# Patient Record
Sex: MALE | Race: OTHER | HISPANIC OR LATINO | URBAN - METROPOLITAN AREA
[De-identification: names, ages, dates, MRNs, and addresses within clinical notes are randomized per-mention and may not be internally consistent; named-entity substitution may affect disease eponyms.]

---

## 2021-01-29 ENCOUNTER — INPATIENT (INPATIENT)
Facility: HOSPITAL | Age: 61
LOS: 4 days | Discharge: NURSING, REHAB OR LONG-TERM CARE | End: 2021-02-03
Attending: INTERNAL MEDICINE | Admitting: INTERNAL MEDICINE
Payer: COMMERCIAL

## 2021-01-29 VITALS
HEIGHT: 71 IN | HEART RATE: 67 BPM | WEIGHT: 179.9 LBS | OXYGEN SATURATION: 97 % | RESPIRATION RATE: 18 BRPM | DIASTOLIC BLOOD PRESSURE: 76 MMHG | TEMPERATURE: 98 F | SYSTOLIC BLOOD PRESSURE: 137 MMHG

## 2021-01-29 LAB
ALBUMIN SERPL ELPH-MCNC: 2.9 G/DL — LOW (ref 3.3–5)
ALP SERPL-CCNC: 92 U/L — SIGNIFICANT CHANGE UP (ref 40–120)
ALT FLD-CCNC: 22 U/L — SIGNIFICANT CHANGE UP (ref 12–78)
ANION GAP SERPL CALC-SCNC: 5 MMOL/L — SIGNIFICANT CHANGE UP (ref 5–17)
APTT BLD: 29.1 SEC — SIGNIFICANT CHANGE UP (ref 27.5–35.5)
AST SERPL-CCNC: 16 U/L — SIGNIFICANT CHANGE UP (ref 15–37)
BASOPHILS # BLD AUTO: 0.08 K/UL — SIGNIFICANT CHANGE UP (ref 0–0.2)
BASOPHILS NFR BLD AUTO: 0.9 % — SIGNIFICANT CHANGE UP (ref 0–2)
BILIRUB SERPL-MCNC: 0.3 MG/DL — SIGNIFICANT CHANGE UP (ref 0.2–1.2)
BUN SERPL-MCNC: 8 MG/DL — SIGNIFICANT CHANGE UP (ref 7–23)
CALCIUM SERPL-MCNC: 8.5 MG/DL — SIGNIFICANT CHANGE UP (ref 8.5–10.1)
CHLORIDE SERPL-SCNC: 111 MMOL/L — HIGH (ref 96–108)
CO2 SERPL-SCNC: 24 MMOL/L — SIGNIFICANT CHANGE UP (ref 22–31)
CREAT SERPL-MCNC: 0.71 MG/DL — SIGNIFICANT CHANGE UP (ref 0.5–1.3)
EOSINOPHIL # BLD AUTO: 0.4 K/UL — SIGNIFICANT CHANGE UP (ref 0–0.5)
EOSINOPHIL NFR BLD AUTO: 4.5 % — SIGNIFICANT CHANGE UP (ref 0–6)
FLUAV AG NPH QL: SIGNIFICANT CHANGE UP COUNTS
FLUBV AG NPH QL: SIGNIFICANT CHANGE UP COUNTS
GLUCOSE BLDC GLUCOMTR-MCNC: 120 MG/DL — HIGH (ref 70–99)
GLUCOSE SERPL-MCNC: 100 MG/DL — HIGH (ref 70–99)
HCT VFR BLD CALC: 41 % — SIGNIFICANT CHANGE UP (ref 39–50)
HGB BLD-MCNC: 13.8 G/DL — SIGNIFICANT CHANGE UP (ref 13–17)
IMM GRANULOCYTES NFR BLD AUTO: 0.3 % — SIGNIFICANT CHANGE UP (ref 0–1.5)
INR BLD: 1.13 RATIO — SIGNIFICANT CHANGE UP (ref 0.88–1.16)
LYMPHOCYTES # BLD AUTO: 1.98 K/UL — SIGNIFICANT CHANGE UP (ref 1–3.3)
LYMPHOCYTES # BLD AUTO: 22.3 % — SIGNIFICANT CHANGE UP (ref 13–44)
MCHC RBC-ENTMCNC: 32 PG — SIGNIFICANT CHANGE UP (ref 27–34)
MCHC RBC-ENTMCNC: 33.7 GM/DL — SIGNIFICANT CHANGE UP (ref 32–36)
MCV RBC AUTO: 95.1 FL — SIGNIFICANT CHANGE UP (ref 80–100)
MONOCYTES # BLD AUTO: 0.59 K/UL — SIGNIFICANT CHANGE UP (ref 0–0.9)
MONOCYTES NFR BLD AUTO: 6.6 % — SIGNIFICANT CHANGE UP (ref 2–14)
NEUTROPHILS # BLD AUTO: 5.81 K/UL — SIGNIFICANT CHANGE UP (ref 1.8–7.4)
NEUTROPHILS NFR BLD AUTO: 65.4 % — SIGNIFICANT CHANGE UP (ref 43–77)
NRBC # BLD: 0 /100 WBCS — SIGNIFICANT CHANGE UP (ref 0–0)
PLATELET # BLD AUTO: 216 K/UL — SIGNIFICANT CHANGE UP (ref 150–400)
POTASSIUM SERPL-MCNC: 3.7 MMOL/L — SIGNIFICANT CHANGE UP (ref 3.5–5.3)
POTASSIUM SERPL-SCNC: 3.7 MMOL/L — SIGNIFICANT CHANGE UP (ref 3.5–5.3)
PROT SERPL-MCNC: 7.5 GM/DL — SIGNIFICANT CHANGE UP (ref 6–8.3)
PROTHROM AB SERPL-ACNC: 13 SEC — SIGNIFICANT CHANGE UP (ref 10.6–13.6)
RBC # BLD: 4.31 M/UL — SIGNIFICANT CHANGE UP (ref 4.2–5.8)
RBC # FLD: 13.6 % — SIGNIFICANT CHANGE UP (ref 10.3–14.5)
RSV RNA NPH QL NAA+NON-PROBE: SIGNIFICANT CHANGE UP COUNTS
SARS-COV-2 RNA SPEC QL NAA+PROBE: SIGNIFICANT CHANGE UP COUNTS
SODIUM SERPL-SCNC: 140 MMOL/L — SIGNIFICANT CHANGE UP (ref 135–145)
TROPONIN I SERPL-MCNC: <.015 NG/ML — SIGNIFICANT CHANGE UP (ref 0.01–0.04)
WBC # BLD: 8.89 K/UL — SIGNIFICANT CHANGE UP (ref 3.8–10.5)
WBC # FLD AUTO: 8.89 K/UL — SIGNIFICANT CHANGE UP (ref 3.8–10.5)

## 2021-01-29 PROCEDURE — 99285 EMERGENCY DEPT VISIT HI MDM: CPT

## 2021-01-29 PROCEDURE — 70450 CT HEAD/BRAIN W/O DYE: CPT | Mod: 26,MA,59

## 2021-01-29 PROCEDURE — 70487 CT MAXILLOFACIAL W/DYE: CPT | Mod: 26

## 2021-01-29 PROCEDURE — 99223 1ST HOSP IP/OBS HIGH 75: CPT

## 2021-01-29 PROCEDURE — 70498 CT ANGIOGRAPHY NECK: CPT | Mod: 26

## 2021-01-29 PROCEDURE — 70496 CT ANGIOGRAPHY HEAD: CPT | Mod: 26

## 2021-01-29 PROCEDURE — 71045 X-RAY EXAM CHEST 1 VIEW: CPT | Mod: 26

## 2021-01-29 PROCEDURE — 93010 ELECTROCARDIOGRAM REPORT: CPT

## 2021-01-29 RX ORDER — ASPIRIN/CALCIUM CARB/MAGNESIUM 324 MG
81 TABLET ORAL DAILY
Refills: 0 | Status: DISCONTINUED | OUTPATIENT
Start: 2021-01-30 | End: 2021-02-03

## 2021-01-29 RX ORDER — ENOXAPARIN SODIUM 100 MG/ML
40 INJECTION SUBCUTANEOUS DAILY
Refills: 0 | Status: DISCONTINUED | OUTPATIENT
Start: 2021-01-29 | End: 2021-02-03

## 2021-01-29 RX ORDER — ATORVASTATIN CALCIUM 80 MG/1
40 TABLET, FILM COATED ORAL AT BEDTIME
Refills: 0 | Status: DISCONTINUED | OUTPATIENT
Start: 2021-01-29 | End: 2021-02-03

## 2021-01-29 RX ORDER — ASPIRIN/CALCIUM CARB/MAGNESIUM 324 MG
325 TABLET ORAL ONCE
Refills: 0 | Status: COMPLETED | OUTPATIENT
Start: 2021-01-29 | End: 2021-01-29

## 2021-01-29 RX ORDER — SODIUM CHLORIDE 9 MG/ML
1000 INJECTION, SOLUTION INTRAVENOUS
Refills: 0 | Status: DISCONTINUED | OUTPATIENT
Start: 2021-01-29 | End: 2021-02-01

## 2021-01-29 RX ADMIN — ATORVASTATIN CALCIUM 40 MILLIGRAM(S): 80 TABLET, FILM COATED ORAL at 22:04

## 2021-01-29 RX ADMIN — SODIUM CHLORIDE 75 MILLILITER(S): 9 INJECTION, SOLUTION INTRAVENOUS at 22:04

## 2021-01-29 RX ADMIN — ENOXAPARIN SODIUM 40 MILLIGRAM(S): 100 INJECTION SUBCUTANEOUS at 23:48

## 2021-01-29 RX ADMIN — Medication 325 MILLIGRAM(S): at 18:51

## 2021-01-29 NOTE — H&P ADULT - ASSESSMENT
61yo male no pertinent PMH presents with stroke.  Per daughter, pt had symptoms starting yesterday at 10am [place] the [place]. He was seen in ER, had CT and covid test and he signed him out and took a flight to [place].     Pt just got off plane and came here. Pt with right sided weakness, but denies headache, cp, sob, dizziness, NV.  61yo male no pertinent PMH presents with stroke.  Per daughter, pt had symptoms starting yesterday at 10am in the [place]. He was seen in ER, had CT and covid test and he signed him out and took a flight to [place].   Pt just got off plane and came here. Pt with right sided weakness, but denies headache, cp, sob, dizziness, NV.     Plan: CVA with left HP, likely right corona radiata infarct. Will start ASA 81 mg/day, Lipitor 40 mg/day, IVF while NPO.   Lipid panel in AM.     Speech and Swallow to determine diet in AM. PT eval.     Needs TTE, tele admit, MRI brain non-contrast. Neuro requests CTA neck.

## 2021-01-29 NOTE — ED PROVIDER NOTE - CLINICAL SUMMARY MEDICAL DECISION MAKING FREE TEXT BOX
Pt with stroke, outside of window for tpa/endovascular. CT noted, will add on CT maxillo with contrast for dr guerrero to follow.

## 2021-01-29 NOTE — ED ADULT TRIAGE NOTE - CHIEF COMPLAINT QUOTE
pt noted with l sided weakness l facial droop started yesterday at 1000am in [place] dx'd with cva in er in  but [place] wanted [place] come [place] and eval denies pain/headache or dizziness at present a&ox3 at present

## 2021-01-29 NOTE — ED PROVIDER NOTE - OBJECTIVE STATEMENT
59yo male with no pertinent pmh presents with stroke. Per daughter, pt had symptoms starting yesterday at 10am in the [place]. He was seen in ER, had CT and covid and he signed him out and took a flight to [place]. Pt just got off flight and came here. Pt with weakness, but denies headache, cp, sob, dizziness, NV.     No fever/chills, No photophobia/eye pain/changes in vision, No ear pain/sore throat/dysphagia, No chest pain/palpitations, no SOB/cough, no wheeze/stridor, No abdominal pain, No N/V/D, no dysuria/frequency/discharge, No neck/back pain, no rash, + stroke symptoms

## 2021-01-29 NOTE — PATIENT PROFILE ADULT - VISION (WITH CORRECTIVE LENSES IF THE PATIENT USUALLY WEARS THEM):
wears glasses, did not bring to hospital/Partially impaired: cannot see medication labels or newsprint, but can see obstacles in path, and the surrounding layout; can count fingers at arm's length

## 2021-01-29 NOTE — H&P ADULT - HISTORY OF PRESENT ILLNESS
61yo male no pertinent PMH presents with stroke.  Per daughter, pt had symptoms starting yesterday at 10am in the [place]. He was seen in ER, had CT and covid test and he signed him out and took a flight to [place].     Pt just got off plane and came here. Pt with right sided weakness, but denies headache, cp, sob, dizziness, NV.  61yo male no pertinent PMH presents with stroke.  Per daughter, pt had symptoms starting yesterday at 10am [place] the [place]. He was seen in ER, had CT and covid test and he signed him out and took a flight to [place].     Pt just got off plane and came here. Pt with left arm weakness and dysarthria, but denies headache, cp, sob, dizziness, NV.

## 2021-01-29 NOTE — H&P ADULT - NSHPPHYSICALEXAM_GEN_ALL_CORE
PHYSICAL EXAMINATION:  Vital Signs Last 24 Hrs  T(C): 36.8 (29 Jan 2021 15:34), Max: 36.8 (29 Jan 2021 15:34)  T(F): 98.2 (29 Jan 2021 15:34), Max: 98.2 (29 Jan 2021 15:34)  HR: 58 (29 Jan 2021 16:12) (58 - 67)  BP: 135/71 (29 Jan 2021 16:12) (135/71 - 146/77)  BP(mean): --  RR: 19 (29 Jan 2021 16:12) (18 - 22)  SpO2: 95% (29 Jan 2021 16:12) (95% - 98%)  CAPILLARY BLOOD GLUCOSE      POCT Blood Glucose.: 120 mg/dL (29 Jan 2021 15:41)      GENERAL: NAD, well-groomed, well-developed  HEAD:  atraumatic, normocephalic  EYES: sclera anicteric  ENMT: mucous membranes moist  NECK: supple, No JVD  CHEST/LUNG: clear to auscultation bilaterally; no rales, rhonchi, or wheezing b/l  HEART: normal S1, S2  ABDOMEN: BS+, soft, ND, NT   EXTREMITIES:  pulses palpable; no clubbing, cyanosis, or edema b/l LEs  NEURO: awake, alert, interactive; moves all extremities  SKIN: no rashes or lesions PHYSICAL EXAMINATION:  Vital Signs Last 24 Hrs  T(C): 36.8 (29 Jan 2021 15:34), Max: 36.8 (29 Jan 2021 15:34)  T(F): 98.2 (29 Jan 2021 15:34), Max: 98.2 (29 Jan 2021 15:34)  HR: 58 (29 Jan 2021 16:12) (58 - 67)  BP: 135/71 (29 Jan 2021 16:12) (135/71 - 146/77)  BP(mean): --  RR: 19 (29 Jan 2021 16:12) (18 - 22)  SpO2: 95% (29 Jan 2021 16:12) (95% - 98%)  CAPILLARY BLOOD GLUCOSE      POCT Blood Glucose.: 120 mg/dL (29 Jan 2021 15:41)      GENERAL: NAD, well-groomed, well-developed, seen in ER  HEAD:  atraumatic, normocephalic  EYES: sclera anicteric  ENMT: mucous membranes moist  NECK: supple, No JVD  CHEST/LUNG: clear to auscultation bilaterally; no rales, rhonchi, or wheezing b/l  HEART: normal S1, S2  ABDOMEN: BS+, soft, ND, NT   EXTREMITIES:  pulses palpable; no clubbing, cyanosis, or edema b/l LEs  NEURO: awake, alert, interactive; moves all extremities  left arm 1/5 MP, left leg 5/5 MP. Right side 5/5 MP. Left central facial.   SKIN: no rashes or lesions

## 2021-01-29 NOTE — ED ADULT NURSE NOTE - CHIEF COMPLAINT QUOTE
pt noted with l sided weakness l facial droop started yesterday at 1000am in [place] dx[place] with cva in [place]  but [place] and eval denies pain/headache or dizziness at present a&ox3 at present

## 2021-01-29 NOTE — ED ADULT NURSE REASSESSMENT NOTE - NS ED NURSE REASSESS COMMENT FT1
Assumed care. Pt laying comfortably in bed. A&Ox4. NSR on the cardiac monitor. VSS. No change in pt condition noted. L sided weakness noted in L arm plus L facial droop. Safety and comfort measures maintained will monitor.

## 2021-01-29 NOTE — ED PROVIDER NOTE - PHYSICAL EXAMINATION
Gen: Alert, Well appearing. NAD    Head: NC, AT, PERRL, normal lids/conjunctiva   ENT: Bilateral TM WNL, patent oropharynx without erythema/exudate, uvula midline  Neck: supple, no tenderness/meningismus  Pulm: Bilateral clear BS, normal resp effort  CV: RRR, no M/R/G, +dist pulses   Abd: soft, NT/ND, +BS, no guarding/rebound tenderness  Mskel: no edema/erythema/cyanosis   Skin: no rash, no bruising  Neuro: AAOx3 see stroke note

## 2021-01-29 NOTE — H&P ADULT - NSHPLABSRESULTS_GEN_ALL_CORE
LABS:                        13.8   8.89  )-----------( 216      ( 29 Jan 2021 16:20 )             41.0     01-29    140  |  111<H>  |  8   ----------------------------<  100<H>  3.7   |  24  |  0.71    Ca    8.5      29 Jan 2021 16:20    TPro  7.5  /  Alb  2.9<L>  /  TBili  0.3  /  DBili  x   /  AST  16  /  ALT  22  /  AlkPhos  92  01-29    PT/INR - ( 29 Jan 2021 16:20 )   PT: 13.0 sec;   INR: 1.13 ratio         PTT - ( 29 Jan 2021 16:20 )  PTT:29.1 sec        RADIOLOGY & ADDITIONAL TESTS:

## 2021-01-29 NOTE — ED PROVIDER NOTE - NIH STROKE SCALE: 6A. MOTOR LEG, LEFT, QM
(1) Drift; leg falls by the end of the 5-sec period but does not hit bed (0) No drift; leg holds 30 degree position for full 5 secs

## 2021-01-29 NOTE — ED ADULT NURSE NOTE - OBJECTIVE STATEMENT
Pt is a 59 yo M with c/o left sided weakness and left sided facial droop that began yesterday approximately 1000am in [place]. Was seen in ED in [facility] and diagnosed with CVA but [place] and eval denies pain/headache or dizziness at present a&ox3 at present.

## 2021-01-29 NOTE — ED ADULT NURSE NOTE - NSIMPLEMENTINTERV_GEN_ALL_ED
Implemented All Fall Risk Interventions:  [place] to call system. Call bell, personal items and telephone within reach. Instruct patient to call for assistance. Room bathroom lighting operational. Non-slip footwear when patient is off stretcher. Physically safe environment: no spills, clutter or unnecessary equipment. Stretcher in lowest position, wheels locked, appropriate side rails in place. Provide visual cue, wrist band, yellow gown, etc. Monitor gait and stability. Monitor for mental status changes and reorient to person, place, and time. Review medications for side effects contributing to fall risk. Reinforce activity limits and safety measures with patient and family.

## 2021-01-30 LAB
A1C WITH ESTIMATED AVERAGE GLUCOSE RESULT: 5.2 % — SIGNIFICANT CHANGE UP (ref 4–5.6)
ANION GAP SERPL CALC-SCNC: 5 MMOL/L — SIGNIFICANT CHANGE UP (ref 5–17)
BUN SERPL-MCNC: 9 MG/DL — SIGNIFICANT CHANGE UP (ref 7–23)
CALCIUM SERPL-MCNC: 8.5 MG/DL — SIGNIFICANT CHANGE UP (ref 8.5–10.1)
CHLORIDE SERPL-SCNC: 114 MMOL/L — HIGH (ref 96–108)
CHOLEST SERPL-MCNC: 126 MG/DL — SIGNIFICANT CHANGE UP
CHOLEST SERPL-MCNC: 128 MG/DL — SIGNIFICANT CHANGE UP
CO2 SERPL-SCNC: 25 MMOL/L — SIGNIFICANT CHANGE UP (ref 22–31)
CREAT SERPL-MCNC: 0.59 MG/DL — SIGNIFICANT CHANGE UP (ref 0.5–1.3)
ESTIMATED AVERAGE GLUCOSE: 103 MG/DL — SIGNIFICANT CHANGE UP (ref 68–114)
GLUCOSE SERPL-MCNC: 86 MG/DL — SIGNIFICANT CHANGE UP (ref 70–99)
HCV AB S/CO SERPL IA: 0.1 S/CO — SIGNIFICANT CHANGE UP (ref 0–0.99)
HCV AB SERPL-IMP: SIGNIFICANT CHANGE UP
HDLC SERPL-MCNC: 21 MG/DL — LOW
HDLC SERPL-MCNC: 21 MG/DL — LOW
LIPID PNL WITH DIRECT LDL SERPL: 83 MG/DL — SIGNIFICANT CHANGE UP
LIPID PNL WITH DIRECT LDL SERPL: 86 MG/DL — SIGNIFICANT CHANGE UP
NON HDL CHOLESTEROL: 105 MG/DL — SIGNIFICANT CHANGE UP
NON HDL CHOLESTEROL: 107 MG/DL — SIGNIFICANT CHANGE UP
POTASSIUM SERPL-MCNC: 3.8 MMOL/L — SIGNIFICANT CHANGE UP (ref 3.5–5.3)
POTASSIUM SERPL-SCNC: 3.8 MMOL/L — SIGNIFICANT CHANGE UP (ref 3.5–5.3)
SODIUM SERPL-SCNC: 144 MMOL/L — SIGNIFICANT CHANGE UP (ref 135–145)
TRIGL SERPL-MCNC: 106 MG/DL — SIGNIFICANT CHANGE UP
TRIGL SERPL-MCNC: 106 MG/DL — SIGNIFICANT CHANGE UP

## 2021-01-30 PROCEDURE — 99232 SBSQ HOSP IP/OBS MODERATE 35: CPT

## 2021-01-30 PROCEDURE — 93306 TTE W/DOPPLER COMPLETE: CPT | Mod: 26

## 2021-01-30 PROCEDURE — 70551 MRI BRAIN STEM W/O DYE: CPT | Mod: 26

## 2021-01-30 RX ADMIN — ENOXAPARIN SODIUM 40 MILLIGRAM(S): 100 INJECTION SUBCUTANEOUS at 13:33

## 2021-01-30 RX ADMIN — ATORVASTATIN CALCIUM 40 MILLIGRAM(S): 80 TABLET, FILM COATED ORAL at 20:53

## 2021-01-30 RX ADMIN — SODIUM CHLORIDE 75 MILLILITER(S): 9 INJECTION, SOLUTION INTRAVENOUS at 13:44

## 2021-01-30 RX ADMIN — Medication 81 MILLIGRAM(S): at 13:33

## 2021-01-30 NOTE — OCCUPATIONAL THERAPY INITIAL EVALUATION ADULT - GENERAL OBSERVATIONS, REHAB EVAL
t was encountered supine in bed; NAD, AXOX4; telemetry, pt able to communicate in english, PT Angely present provided [place] citizen translation PRN. Pt noted with left hemiparesis, dysarthria & decreased dynamic standing balance.

## 2021-01-30 NOTE — PROGRESS NOTE ADULT - ASSESSMENT
59 y/o M with no PMH went to ER in [facility]. He was seen in ER, had CT and covid test and he signed out and took a flight to [place].  Pt just got off plane and came here. Pt with c/o right sided weakness. Found to have CVA.    #Acute / subacute CVA  ASA/ statin   SLP eval  passed bed side swallow eval , will start mechanical soft diet w/ aspiration precautions, d/w RN to watch closely  PT/OT  MRI brain reviewed  follow Echo  telemetry monitoring  LDL 86  hbA1C  pending  Neuro consult- Dr. Johnston    #Parotid lesion  check MRI neck    SQ lovenox for dvt ppx      d/w pt and daughter in detail.

## 2021-01-30 NOTE — OCCUPATIONAL THERAPY INITIAL EVALUATION ADULT - RANGE OF MOTION EXAMINATION
Patient called and PSR explained that it is too early to refill by 8 days, and that she should have enough until 12-13-19. Patient states she takes 2 per day, and her bottle says she filled it last on 11-1-19. Patient that she wanted to order this medication early because of the holiday.     Please call patient to clarify at [phone]   trunk was WFL (within functional limits)

## 2021-01-30 NOTE — OCCUPATIONAL THERAPY INITIAL EVALUATION ADULT - RANGE OF MOTION EXAMINATION, UPPER EXTREMITY
left UE shoulder 0-60 AROM grossly. Elbow/wrist/hand grossly WFL./Right UE Active ROM was WFL (within functional limits)

## 2021-01-30 NOTE — OCCUPATIONAL THERAPY INITIAL EVALUATION ADULT - PERTINENT HX OF CURRENT PROBLEM, REHAB EVAL
Pt admitted with c/o slurred speech and left sided weakness. MRI Head "Acute and/or recent subacute right MCA territory infarct. Underlying chronic ischemic changes also noted with mild volume loss and involutional change.."

## 2021-01-30 NOTE — PHYSICAL THERAPY INITIAL EVALUATION ADULT - BALANCE TRAINING, PT EVAL
pt will increase sitting and standing static and dynamic balance by full grade for safety with ambulation, ADLs and transfers x4 weeks

## 2021-01-30 NOTE — PHYSICAL THERAPY INITIAL EVALUATION ADULT - ACTIVE RANGE OF MOTION EXAMINATION, REHAB EVAL
L UE limited shoulder and elbow AROM to 60 degrees, able to grasp/Right UE Active ROM was WFL (within functional limits)/Left LE Active ROM was WFL (within functional limits)/Right LE Active ROM was WFL (within functional limits)

## 2021-01-30 NOTE — PHYSICAL THERAPY INITIAL EVALUATION ADULT - FINE MOTOR COORDINATION, LEFT HAND, DIADOCHOKINESIS SKILLS, OT EVAL
Anesthesia Evaluation     Patient summary reviewed   no history of anesthetic complications:  NPO Solid Status: Waived due to emergency  NPO Liquid Status: Waived due to emergency           Airway   Mallampati: I  TM distance: >3 FB  Neck ROM: full  No difficulty expected  Dental - normal exam     Pulmonary - negative pulmonary ROS and normal exam   Cardiovascular - normal exam  Exercise tolerance: excellent (>7 METS)    (+) hypertension, hyperlipidemia,       Neuro/Psych- negative ROS  GI/Hepatic/Renal/Endo - negative ROS     Musculoskeletal (-) negative ROS    Abdominal    Substance History      OB/GYN          Other - negative ROS                     Anesthesia Plan    ASA 2 - emergent     general   Rapid sequence  intravenous induction   Anesthetic plan and risks discussed with patient.      
mild impairment

## 2021-01-30 NOTE — CONSULT NOTE ADULT - ASSESSMENT
Subjective Complaints:      Consult requested by ER doctor:                  Attending:     History of Present Illness:  Chief Complaint/Reason for Admission:  History of Present Illness:  HPI:  61yo male no pertinent PMH presents with stroke.  Per daughter, pt had symptoms starting yesterday at 10am in the [place]. He was seen in ER, had CT and covid test and he signed him out and took a flight to [place].     Pt just got off plane and came here. Pt with left arm weakness and dysarthria, but denies headache, cp, sob, dizziness, NV.  (29 Jan 2021 18:26)        PAST MEDICAL & SURGICAL HISTORY:  No pertinent past medical history    No significant past surgical history    60yMale    MEDICATIONS  (STANDING):  aspirin enteric coated 81 milliGRAM(s) Oral daily  atorvastatin 40 milliGRAM(s) Oral at bedtime  enoxaparin Injectable 40 milliGRAM(s) SubCutaneous daily  sodium chloride 0.45%. 1000 milliLiter(s) (75 mL/Hr) IV Continuous <Continuous>    MEDICATIONS  (PRN):      Allergies    No Known Allergies    Intolerances      FAMILY HISTORY:      REVIEW OF SYSTEMS:  General:  No wt loss, fevers, chills, night sweats  Eyes:  Good vision, no reported pain  ENT:  No sore throat, pain, runny nose, dysphagia  CV:  No pain, palpitatioins, hypo/hypertension  Resp:  No dyspnea, cough, tachypnea, wheezing  GI:  No pain, nausea, vomiting, diarrhea, constipatiion  :  No pain, bleeding, incontinence, nocturia  Muscle:  No pain, weakness  Breast:  No pain, abscess, mass, discharge  Neuro:  No weakness, tingling, memory problems  Psych:  No fatigue, insomnia, mood problems, depression  Endocrine:  No polyuria, polydypsia, cold/heat intolerance  Heme:  No petechiae, ecchymosis, easy bruisability  Skin:  No rash, tattoos, scars, edema      Vital Signs Last 24 Hrs  T(C): 37.1 (30 Jan 2021 16:06), Max: 37.1 (30 Jan 2021 16:06)  T(F): 98.8 (30 Jan 2021 16:06), Max: 98.8 (30 Jan 2021 16:06)  HR: 53 (30 Jan 2021 16:06) (46 - 68)  BP: 128/79 (30 Jan 2021 16:06) (123/71 - 155/89)  BP(mean): --  RR: 18 (30 Jan 2021 16:06) (16 - 19)  SpO2: 93% (30 Jan 2021 16:06) (93% - 97%)    GENERAL PHYSICAL EXAM:  General:  Appears stated age, well-groomed, well-nourished, no distress  HEENT:  NC/AT, patent nares w/ pink mucosa, OP clear w/o lesions, PERRL, EOMI, conjunctivae clear, no thyromegaly, nodules, adenopathy, no JVD  Chest:  Full & symmetric excursion, no increased effort, breath sounds clear  Cardiovascular:  Regular rhythm, S1, S2, no murmur/rub/S3/S4, no carotid/femoral/abdominal bruit, radial/pedal pulses 2+, no edema  Abdomen:  Soft, non-tender, non-distended, normoactive bowel sounds, no HSM  Extremities:  Gait & station:   Digits:   Nails:   Joints, Bones, Muscles:   ROM:   Stability:  Skin:  No rash/erythema/ecchymoses/petechiae/wounds/abscess/warm/dry  Musculoskeletal:  Full ROM in all joints w/o swelling/tenderness/effusion    NEUROLOGICAL EXAM:  HENT:  Normocephalic head; atraumatic head.  Neck supple.  ENT: normal looking.  Mental State:    Alert.  Fully oriented to person, place and date.  Coherent.  Speech clear and intact.  Cooperative.  Responds appropriately.    Cranial Nerves:  II-XII:   Pupils round and reactive to light and accommodation.  Extraocular movements full.  Visual fields full (no homonymous hemianopsia).  Visual acuity wnl.  Facial left side wekaness  s intact.  Tongue midline.  Motor Functions:  Moves all extremities.    r cva left hemeparesis s   Sensory Functions:   Intact to touch and pinprick to face and extremities.    Reflexes:  Deep tendon reflexes normoactive to biceps, knees and ankles.  Babinski t (present).  Cerebellar Testing:    Finger to nose intact.  Nystagmus absent.  Neurovascular: Carotid auscultation full without bruits.      LABS:                        13.8   8.89  )-----------( 216      ( 29 Jan 2021 16:20 )             41.0     01-30    144  |  114<H>  |  9   ----------------------------<  86  3.8   |  25  |  0.59    Ca    8.5      30 Jan 2021 09:55    TPro  7.5  /  Alb  2.9<L>  /  TBili  0.3  /  DBili  x   /  AST  16  /  ALT  22  /  AlkPhos  92  01-29    PT/INR - ( 29 Jan 2021 16:20 )   PT: 13.0 sec;   INR: 1.13 ratio         PTT - ( 29 Jan 2021 16:20 )  PTT:29.1 sec        RADIOLOGY & ADDITIONAL STUDIES:      Assessment & Opinion:  events noted r cva left hemepresis htn  for echo dollper asa 81 mg  for pt rehab   tsh b12  will folow  mri mati  noted r cva     Recommendations:  Brain MRI.  Carotid doppler.  Echocardiogram.  EEG.   DVT prophylaxis as ordered.  Medications:

## 2021-01-30 NOTE — PHYSICAL THERAPY INITIAL EVALUATION ADULT - FOLLOWS COMMANDS/ANSWERS QUESTIONS, REHAB EVAL
verbal cueing and manual guidance with carryover, dysarthria/100% of the time/able to follow multistep instructions/able to follow single-step instructions

## 2021-01-30 NOTE — OCCUPATIONAL THERAPY INITIAL EVALUATION ADULT - ADDITIONAL COMMENTS
Pt reports he lives with spouse and children in private house with 5 steps to enter with bilateral hand rails & 1 flight of stairs to second floor bedroom. Pt states he was independent with ADLs and mobility without assistive device prior to admission.

## 2021-01-30 NOTE — PHYSICAL THERAPY INITIAL EVALUATION ADULT - PERTINENT HX OF CURRENT PROBLEM, REHAB EVAL
59yo male no pertinent PMH presents with stroke.  Per daughter, pt had symptoms starting yesterday at 10am in the [place]. He was seen in ER, had CT and covid test and he signed him out and took a flight to [place]. MRI (+) acute/sub acute R MCA territory infarct.

## 2021-01-30 NOTE — PHYSICAL THERAPY INITIAL EVALUATION ADULT - GENERAL OBSERVATIONS, REHAB EVAL
Pt encountered supine in bed, alert and oriented x4, cardiac monitor and iv in place, NAD. Pt has returned from MRI, (+) CVA.

## 2021-01-31 LAB
SARS-COV-2 IGG SERPL QL IA: NEGATIVE — SIGNIFICANT CHANGE UP
SARS-COV-2 IGM SERPL IA-ACNC: <0.1 INDEX — SIGNIFICANT CHANGE UP

## 2021-01-31 PROCEDURE — 99232 SBSQ HOSP IP/OBS MODERATE 35: CPT

## 2021-01-31 PROCEDURE — 70543 MRI ORBT/FAC/NCK W/O &W/DYE: CPT | Mod: 26

## 2021-01-31 RX ADMIN — ATORVASTATIN CALCIUM 40 MILLIGRAM(S): 80 TABLET, FILM COATED ORAL at 20:38

## 2021-01-31 RX ADMIN — SODIUM CHLORIDE 75 MILLILITER(S): 9 INJECTION, SOLUTION INTRAVENOUS at 10:14

## 2021-01-31 RX ADMIN — ENOXAPARIN SODIUM 40 MILLIGRAM(S): 100 INJECTION SUBCUTANEOUS at 10:14

## 2021-01-31 RX ADMIN — Medication 81 MILLIGRAM(S): at 10:14

## 2021-01-31 NOTE — PROGRESS NOTE ADULT - ASSESSMENT
Subjective Complaints:  Historian:             Vital Signs Last 24 Hrs  T(C): 36.9 (31 Jan 2021 16:20), Max: 36.9 (31 Jan 2021 01:41)  T(F): 98.4 (31 Jan 2021 16:20), Max: 98.4 (31 Jan 2021 01:41)  HR: 63 (31 Jan 2021 16:20) (49 - 68)  BP: 136/80 (31 Jan 2021 16:20) (128/72 - 152/83)  BP(mean): --  RR: 18 (31 Jan 2021 16:20) (18 - 20)  SpO2: 93% (31 Jan 2021 16:20) (93% - 97%)    GENERAL PHYSICAL EXAM:  General:  Appears stated age, well-groomed, well-nourished, no distress  HEENT:  NC/AT, patent nares w/ pink mucosa, OP clear w/o lesions, PERRL, EOMI, conjunctivae clear, no thyromegaly, nodules, adenopathy, no JVD  Chest:  Full & symmetric excursion, no increased effort, breath sounds clear  Cardiovascular:  Regular rhythm, S1, S2, no murmur/rub/S3/S4, no carotid/femoral/abdominal bruit, radial/pedal pulses 2+, no edema  Abdomen:  Soft, non-tender, non-distended, normoactive bowel sounds, no HSM  Extremities:  Gait & station:   Digits:   Nails:   Joints, Bones, Muscles:   ROM:   Stability:  Skin:  No rash/erythema/ecchymoses/petechiae/wounds/abscess/warm/dry  Musculoskeletal:  Full ROM in all joints w/o swelling/tenderness/effusion        LABS:    01-30    144  |  114<H>  |  9   ----------------------------<  86  3.8   |  25  |  0.59    Ca    8.5      30 Jan 2021 09:55            RADIOLOGY & ADDITIONAL STUDIES:        Neurology Progress Note:      Mental Status: awaek alert speech fluent       Cranial Nerves: left face wekaness       Motor:   r cva left hemeparesis         Sensory: intact      Cerebellar: defrd      Gait:  unsteady       Assesment/Plan:  r cva left hemeoaresis htn on asa for pt rehab at [facility]

## 2021-01-31 NOTE — PROGRESS NOTE ADULT - ASSESSMENT
59 y/o M with no PMH went to ER in [facility]. He was seen in ER, had CT and covid test and he signed out and took a flight to [place].  Pt just got off plane and came here. Pt with c/o right sided weakness. Found to have CVA.    #Acute / subacute CVA  ASA/ statin   SLP eval  passed bed side swallow eval , on mechanical soft diet w/ aspiration precautions, await official swallow eval  PT/OT eval noted > recommend dc dispo to acute rehab   MRI brain and echo reviewed.  cont telemetry monitoring  hbA1C  5.2  Neuro following    #Parotid lesion  MRI neck done, results pending    SQ lovenox for dvt ppx      d/w pt and daughter Laura in detail. # [phone]     61 y/o M with no PMH went to ER in [facility]. He was seen in ER, had CT and covid test and he signed out and took a flight to [place].  Pt just got off plane and came here. Pt with c/o right sided weakness. Found to have CVA.    #Acute / subacute CVA  ASA/ statin   SLP eval  passed bed side swallow eval , on mechanical soft diet w/ aspiration precautions, await official swallow eval  PT/OT eval noted > recommend dc dispo to acute rehab   MRI brain and echo reviewed.  cont telemetry monitoring  hbA1C  5.2  Neuro following    #Parotid lesion  MRI neck done, results pending    SQ lovenox for dvt ppx      Likely d/c planning in am.  Daughter little hesitant to send him to rehab but would like to speak to Cm and SW tomorrow to discuss options.     d/w pt and daughter Laura in detail. # [phone]

## 2021-02-01 PROCEDURE — 99232 SBSQ HOSP IP/OBS MODERATE 35: CPT

## 2021-02-01 RX ADMIN — Medication 81 MILLIGRAM(S): at 12:01

## 2021-02-01 RX ADMIN — ENOXAPARIN SODIUM 40 MILLIGRAM(S): 100 INJECTION SUBCUTANEOUS at 12:01

## 2021-02-01 RX ADMIN — SODIUM CHLORIDE 75 MILLILITER(S): 9 INJECTION, SOLUTION INTRAVENOUS at 03:06

## 2021-02-01 RX ADMIN — ATORVASTATIN CALCIUM 40 MILLIGRAM(S): 80 TABLET, FILM COATED ORAL at 20:49

## 2021-02-01 NOTE — PROGRESS NOTE ADULT - ASSESSMENT
Subjective Complaints:  Historian:             Vital Signs Last 24 Hrs  T(C): 36.4 (01 Feb 2021 16:45), Max: 37.2 (01 Feb 2021 00:44)  T(F): 97.5 (01 Feb 2021 16:45), Max: 99 (01 Feb 2021 00:44)  HR: 60 (01 Feb 2021 16:45) (45 - 60)  BP: 128/74 (01 Feb 2021 16:45) (128/74 - 159/67)  BP(mean): --  RR: 17 (01 Feb 2021 16:45) (17 - 18)  SpO2: 94% (01 Feb 2021 16:45) (92% - 96%)    GENERAL PHYSICAL EXAM:  General:  Appears stated age, well-groomed, well-nourished, no distress  HEENT:  NC/AT, patent nares w/ pink mucosa, OP clear w/o lesions, PERRL, EOMI, conjunctivae clear, no thyromegaly, nodules, adenopathy, no JVD  Chest:  Full & symmetric excursion, no increased effort, breath sounds clear  Cardiovascular:  Regular rhythm, S1, S2, no murmur/rub/S3/S4, no carotid/femoral/abdominal bruit, radial/pedal pulses 2+, no edema  Abdomen:  Soft, non-tender, non-distended, normoactive bowel sounds, no HSM  Extremities:  Gait & station:   Digits:   Nails:   Joints, Bones, Muscles:   ROM:   Stability:  Skin:  No rash/erythema/ecchymoses/petechiae/wounds/abscess/warm/dry  Musculoskeletal:  Full ROM in all joints w/o swelling/tenderness/effusion        LABS:                RADIOLOGY & ADDITIONAL STUDIES:        Neurology Progress Note:      Mental Status: awaek alert speech fluent       Cranial Nerves: left face wekaness       Motor:   r cva left henmeparesis         Sensory: intact      Cerebellar: defrd      Gait:unsteadsyt       Assesment/Plan:   r  cva  left hemeparesi for acute rehab at [facility]

## 2021-02-01 NOTE — PROGRESS NOTE ADULT - ASSESSMENT
59 y/o M with no PMH went to ER in [facility]. He was seen in ER, had CT and covid test and he signed out and took a flight to [place].  Pt just got off plane and came here. Pt with c/o right sided weakness. Found to have CVA.    #Acute / subacute CVA  ASA/ statin   SLP eval  passed bed side swallow eval , on mechanical soft diet w/ aspiration precautions, await official swallow eval  PT/OT eval noted > recommend dc dispo to acute rehab   MRI brain and echo reviewed.  cont telemetry monitoring  hbA1C  5.2  Neuro following    #Parotid lesion  MRI neck: b/l nodules   will monitor     SQ lovenox for dvt ppx      Likely d/c planning in am.  Daughter little hesitant to send him to rehab but would like to speak to Cm and SW tomorrow to discuss options.     d/w pt and daughter Laura in detail. # [phone]

## 2021-02-02 LAB
FLUAV AG NPH QL: SIGNIFICANT CHANGE UP COUNTS
FLUBV AG NPH QL: SIGNIFICANT CHANGE UP COUNTS
RSV RNA NPH QL NAA+NON-PROBE: SIGNIFICANT CHANGE UP COUNTS
SARS-COV-2 RNA SPEC QL NAA+PROBE: SIGNIFICANT CHANGE UP COUNTS

## 2021-02-02 PROCEDURE — 99232 SBSQ HOSP IP/OBS MODERATE 35: CPT

## 2021-02-02 RX ADMIN — Medication 81 MILLIGRAM(S): at 10:56

## 2021-02-02 RX ADMIN — ENOXAPARIN SODIUM 40 MILLIGRAM(S): 100 INJECTION SUBCUTANEOUS at 10:55

## 2021-02-02 RX ADMIN — ATORVASTATIN CALCIUM 40 MILLIGRAM(S): 80 TABLET, FILM COATED ORAL at 21:05

## 2021-02-02 NOTE — PROGRESS NOTE ADULT - ASSESSMENT
Subjective Complaints:  Historian:             Vital Signs Last 24 Hrs  T(C): 37.1 (02 Feb 2021 16:25), Max: 37.1 (02 Feb 2021 16:25)  T(F): 98.8 (02 Feb 2021 16:25), Max: 98.8 (02 Feb 2021 16:25)  HR: 78 (02 Feb 2021 18:50) (50 - 79)  BP: 129/74 (02 Feb 2021 18:50) (120/77 - 134/85)  BP(mean): --  RR: 18 (02 Feb 2021 18:50) (18 - 20)  SpO2: 92% (02 Feb 2021 18:50) (92% - 96%)    GENERAL PHYSICAL EXAM:  General:  Appears stated age, well-groomed, well-nourished, no distress  HEENT:  NC/AT, patent nares w/ pink mucosa, OP clear w/o lesions, PERRL, EOMI, conjunctivae clear, no thyromegaly, nodules, adenopathy, no JVD  Chest:  Full & symmetric excursion, no increased effort, breath sounds clear  Cardiovascular:  Regular rhythm, S1, S2, no murmur/rub/S3/S4, no carotid/femoral/abdominal bruit, radial/pedal pulses 2+, no edema  Abdomen:  Soft, non-tender, non-distended, normoactive bowel sounds, no HSM  Extremities:  Gait & station:   Digits:   Nails:   Joints, Bones, Muscles:   ROM:   Stability:  Skin:  No rash/erythema/ecchymoses/petechiae/wounds/abscess/warm/dry  Musculoskeletal:  Full ROM in all joints w/o swelling/tenderness/effusion        LABS:                RADIOLOGY & ADDITIONAL STUDIES:        Neurology Progress Note:      Mental Status: awaek alert speech fluent   follows commands       Cranial Nerves: left face wekaness       Motor:   r cv aleft hemeparesis         Sensory:intact      Cerebellar: defrd      Gait: unsteady       Assesment/Plan:  r c  cva    left hemepatresis unsteady gait for acute rehab at   [facility]  on asa will follow

## 2021-02-02 NOTE — PROGRESS NOTE ADULT - ASSESSMENT
#Acute / subacute CVA  MRI Head: 1)  Acute and/or recent subacute right MCA territory infarct. Underlying chronic ischemic changes also noted with mild volume loss and involutional change..  2)  no hemorrhagic lesion or mass. No gross large vessel occlusion.  Echo: benign   cont w/ Aspirin and statin   Rehab on discharge     #Parotid lesion  MRI neck: b/l nodules   will monitor     SQ lovenox for dvt ppx

## 2021-02-03 ENCOUNTER — TRANSCRIPTION ENCOUNTER (OUTPATIENT)
Age: 61
End: 2021-02-03

## 2021-02-03 ENCOUNTER — INPATIENT (INPATIENT)
Facility: HOSPITAL | Age: 61
LOS: 9 days | Discharge: HOME HEALTH | DRG: 949 | End: 2021-02-13
Attending: SPECIALIST | Admitting: STUDENT IN AN ORGANIZED HEALTH CARE EDUCATION/TRAINING PROGRAM
Payer: COMMERCIAL

## 2021-02-03 VITALS
TEMPERATURE: 98 F | RESPIRATION RATE: 18 BRPM | HEART RATE: 78 BPM | OXYGEN SATURATION: 94 % | SYSTOLIC BLOOD PRESSURE: 118 MMHG | DIASTOLIC BLOOD PRESSURE: 77 MMHG

## 2021-02-03 VITALS
DIASTOLIC BLOOD PRESSURE: 78 MMHG | TEMPERATURE: 98 F | HEIGHT: 67 IN | OXYGEN SATURATION: 98 % | SYSTOLIC BLOOD PRESSURE: 124 MMHG | WEIGHT: 205.69 LBS | HEART RATE: 58 BPM | RESPIRATION RATE: 14 BRPM

## 2021-02-03 DIAGNOSIS — I63.9 CEREBRAL INFARCTION, UNSPECIFIED: ICD-10-CM

## 2021-02-03 PROBLEM — Z78.9 OTHER SPECIFIED HEALTH STATUS: Chronic | Status: ACTIVE | Noted: 2021-01-29

## 2021-02-03 PROCEDURE — 99223 1ST HOSP IP/OBS HIGH 75: CPT

## 2021-02-03 PROCEDURE — 99239 HOSP IP/OBS DSCHRG MGMT >30: CPT

## 2021-02-03 RX ORDER — ATORVASTATIN CALCIUM 80 MG/1
1 TABLET, FILM COATED ORAL
Qty: 0 | Refills: 0 | DISCHARGE
Start: 2021-02-03

## 2021-02-03 RX ORDER — PETROLATUM,WHITE
1 JELLY (GRAM) TOPICAL DAILY
Refills: 0 | Status: DISCONTINUED | OUTPATIENT
Start: 2021-02-03 | End: 2021-02-13

## 2021-02-03 RX ORDER — POLYETHYLENE GLYCOL 3350 17 G/17G
17 POWDER, FOR SOLUTION ORAL DAILY
Refills: 0 | Status: DISCONTINUED | OUTPATIENT
Start: 2021-02-03 | End: 2021-02-04

## 2021-02-03 RX ORDER — ATORVASTATIN CALCIUM 80 MG/1
40 TABLET, FILM COATED ORAL AT BEDTIME
Refills: 0 | Status: DISCONTINUED | OUTPATIENT
Start: 2021-02-03 | End: 2021-02-13

## 2021-02-03 RX ORDER — ENOXAPARIN SODIUM 100 MG/ML
40 INJECTION SUBCUTANEOUS DAILY
Refills: 0 | Status: DISCONTINUED | OUTPATIENT
Start: 2021-02-03 | End: 2021-02-13

## 2021-02-03 RX ORDER — SENNA PLUS 8.6 MG/1
2 TABLET ORAL AT BEDTIME
Refills: 0 | Status: DISCONTINUED | OUTPATIENT
Start: 2021-02-03 | End: 2021-02-13

## 2021-02-03 RX ORDER — ASPIRIN/CALCIUM CARB/MAGNESIUM 324 MG
1 TABLET ORAL
Qty: 0 | Refills: 0 | DISCHARGE
Start: 2021-02-03

## 2021-02-03 RX ORDER — ASPIRIN/CALCIUM CARB/MAGNESIUM 324 MG
81 TABLET ORAL DAILY
Refills: 0 | Status: DISCONTINUED | OUTPATIENT
Start: 2021-02-03 | End: 2021-02-13

## 2021-02-03 RX ADMIN — POLYETHYLENE GLYCOL 3350 17 GRAM(S): 17 POWDER, FOR SOLUTION ORAL at 17:42

## 2021-02-03 RX ADMIN — Medication 200 MILLIGRAM(S): at 16:50

## 2021-02-03 RX ADMIN — Medication 1 APPLICATION(S): at 17:35

## 2021-02-03 RX ADMIN — Medication 81 MILLIGRAM(S): at 11:06

## 2021-02-03 RX ADMIN — SENNA PLUS 2 TABLET(S): 8.6 TABLET ORAL at 21:07

## 2021-02-03 RX ADMIN — ATORVASTATIN CALCIUM 40 MILLIGRAM(S): 80 TABLET, FILM COATED ORAL at 21:07

## 2021-02-03 RX ADMIN — ENOXAPARIN SODIUM 40 MILLIGRAM(S): 100 INJECTION SUBCUTANEOUS at 11:07

## 2021-02-03 RX ADMIN — Medication 200 MILLIGRAM(S): at 11:07

## 2021-02-03 NOTE — PROGRESS NOTE ADULT - ASSESSMENT
61yo M denies significant PMH presented with L sided weakness, found to have acute / subacute R MCA infarct.  Symptoms improving. Stable for Acute rehab placement.      #Acute / subacute CVA - MRI Head: 1)  Acute and/or recent subacute right MCA territory infarct. Underlying chronic ischemic changes also noted with mild volume loss and involutional change..  2)  no hemorrhagic lesion or mass. No gross large vessel occlusion. Echo: benign.  Symptoms improving.  Cont w/ Aspirin and statin Rehab on discharge     #Parotid lesion - MRI showed: Nonspecific intraparotid nodules most consistent with adenopathy.  Discussed with patient to followup with PMD to monitor for progression.

## 2021-02-03 NOTE — H&P ADULT - ASSESSMENT
Assessment/Plan:  SHAWNA LAURENT is a 60y with no significant PMHx presented to [facility] 1/29/21 for stroke symptoms that began the day before in [place]. He signed out of ED, flew to [place], and went to [facility]. MRI showed R MCA stroke.       Comprehensive Multidisciplinary Rehab Program:  - Start comprehensive rehab program, PT/OT/SLP 3 hours a day, 5 days a week.  - Precautions: falls, aspiration    #R MCA CVA  - Continue ASA and Lipitor  - F/u neuro, Dr. Mota, outpatient    #Intraparotid Nodules  - F/u with PCP outpatient    /Bladder:   - At risk for incontinence and retention due to neurologic diagnosis and limited mobility  - Currently patient voids independently  - Catheter/bladder nursing protocol with bladder scans per routine with straight cath for >400cc.  - Encourage timed voids every 4 hours while awake for independence and to promote continence during therapy.    Skin/Pressure Injury:   - At risk for pressure injury due to neurologic diagnosis and relative immobility.  - Skin assessment on admission: ***  - Turn every 2 hours while in bed, air mattress  - Soft heel protectors  - Skin barrier cream as needed  - Nursing to monitor skin Qshift    Diet/Dysphagia:  - [facility]  - Aspiration Precautions  - SLP consult for swallow function evaluation and treatment    DVT ppx:  - Lovenox  - SCDs  ---------------  Outpatient Follow-up (Specialty/Name of physician):  Alfreod Mota  NEUROLOGY  [address] [place] [address] [id number]  Phone: [phone]  Fax: [phone]  --------------  Goals: Safe discharge to home  Estimated Length of Stay: 10-14 days  Rehab Potential: Good  Medical Prognosis: Good  Estimated Disposition: Home with Home Care  ---------------    PRESCREEN COMPARISON:  I have reviewed the prescreen information and I have found no relevant changes between the preadmission screening and my post admission evaluation.    RATIONALE FOR INPATIENT ADMISSION: Patient demonstrates the following:  [X] Medically appropriate for rehabilitation admission  [X] Has attainable rehab goals with an appropriate initial discharge plan  [X]Has rehabilitation potential (expected to make a significant improvement within a reasonable period of time)  [X] Requires close medical management by a rehab physician, rehab nursing care, Hospitalist and comprehensive interdisciplinary team (including PT, OT and/or SLP, Prosthetics and Orthotics)       Assessment/Plan:  SHAWNA LAURENT is a 60y with no significant PMHx presented to [facility] 1/29/21 for stroke symptoms that began the day before in [place]. He signed out of ED, flew to [place], and went to [facility]. MRI showed R MCA stroke.       #R MCA CVA with Left sided weakness  - Continue ASA and Lipitor  - F/u neuro, Dr. Mota, outpatient  - Start comprehensive rehab program, PT/OT/SLP 3 hours a day, 5 days a week.  - Precautions: falls, aspiration    #Intraparotid Nodules  - F/u with PCP outpatient    /Bladder:   - Currently patient voids independently  - Catheter/bladder nursing protocol with bladder scans per routine with straight cath for >400cc.  - Encourage timed voids every 4 hours while awake for independence and to promote continence during therapy.    Skin/Pressure Injury:   - At risk for pressure injury due to neurologic diagnosis and relative immobility.  - Skin assessment on admission: intact  - Turn every 2 hours while in bed, air mattress  - Soft heel protectors  - Skin barrier cream as needed  - Nursing to monitor skin Qshift    Diet/Dysphagia:  - Diet Consistency/Modifications: [facility] soft  - Aspiration Precautions  - SLP consult for swallow function evaluation and treatment    DVT ppx:  - Lovenox  - SCDs  ---------------  Outpatient Follow-up (Specialty/Name of physician):  Alfredo Mota  NEUROLOGY  [address] [place] [address] [id number]  Phone: [phone]  Fax: [phone]  --------------  Goals: Safe discharge to home  Estimated Length of Stay: 10-14 days  Rehab Potential: Good  Medical Prognosis: Good  Estimated Disposition: Home with Home Care  ---------------    PRESCREEN COMPARISON:  I have reviewed the prescreen information and I have found no relevant changes between the preadmission screening and my post admission evaluation.    RATIONALE FOR INPATIENT ADMISSION: Patient demonstrates the following:  [X] Medically appropriate for rehabilitation admission  [X] Has attainable rehab goals with an appropriate initial discharge plan  [X]Has rehabilitation potential (expected to make a significant improvement within a reasonable period of time)  [X] Requires close medical management by a rehab physician, rehab nursing care, Hospitalist and comprehensive interdisciplinary team (including PT, OT and/or SLP, Prosthetics and Orthotics)       Assessment/Plan:  SHAWNA LAURENT is a 60y with no significant PMHx presented to [facility] 1/29/21 for stroke symptoms that began the day before in [place]. He signed out of ED, flew to [place], and went to [facility]. MRI showed R MCA stroke.       #R MCA CVA with Left sided weakness  - Continue ASA and Lipitor  - F/u neuro, Dr. Mota, outpatient  - Start comprehensive rehab program, PT/OT/SLP 3 hours a day, 5 days a week.  - Precautions: falls, aspiration    #Intraparotid Nodules  - F/u with PCP outpatient    /Bladder:   - monitor voiding- PVRs  - Encourage timed voids every 4 hours while awake for independence and to promote continence during therapy.    Skin/Pressure Injury:   - At risk for pressure injury due to neurologic diagnosis and relative immobility.  - Skin assessment on admission: intact  - Turn every 2 hours while in bed, air mattress  - Soft heel protectors  - Skin barrier cream as needed  - Nursing to monitor skin Qshift    Diet/Dysphagia:  - [facility]  - Aspiration Precautions  - SLP consult for swallow function evaluation and treatment    DVT ppx:  - Lovenox  - SCDs  ---------------  Outpatient Follow-up (Specialty/Name of physician):  Alfredo Mota  NEUROLOGY  [address] [place] [address] [id number]  Phone: [phone]  Fax: [phone]  --------------  Goals: Safe discharge to home  Estimated Length of Stay: 10-14 days  Rehab Potential: Good  Medical Prognosis: Good  Estimated Disposition: Home with Home Care  ---------------    PRESCREEN COMPARISON:  I have reviewed the prescreen information and I have found no relevant changes between the preadmission screening and my post admission evaluation.    RATIONALE FOR INPATIENT ADMISSION: Patient demonstrates the following:  [X] Medically appropriate for rehabilitation admission  [X] Has attainable rehab goals with an appropriate initial discharge plan  [X]Has rehabilitation potential (expected to make a significant improvement within a reasonable period of time)  [X] Requires close medical management by a rehab physician, rehab nursing care, Hospitalist and comprehensive interdisciplinary team (including PT, OT and/or SLP, Prosthetics and Orthotics)

## 2021-02-03 NOTE — H&P ADULT - NSHPPHYSICALEXAM_GEN_ALL_CORE
Vital Signs  T(C): 36.8 (02-03-21 @ 13:24), Max: 37.2 (02-03-21 @ 00:10)  HR: 78 (02-03-21 @ 13:24) (47 - 79)  BP: 118/77 (02-03-21 @ 13:24) (118/77 - 136/77)  RR: 18 (02-03-21 @ 13:24) (18 - 18)  SpO2: 94% (02-03-21 @ 13:24) (91% - 96%)    Gen - NAD, Comfortable  HEENT - NCAT, EOMI, MMM  Neck - Supple, No limited ROM  Pulm - CTAB, No wheeze, No rhonchi, No crackles  Cardiovascular - RRR, S1S2, No m/r/g  Abdomen - Soft, NT/ND, +BS  Extremities - No C/C/E, No calf tenderness  Neuro-     Cognitive - AAOx3     Communication - Fluent, No dysarthria     Attention: Intact      Judgement: Good evidence of judgement     Memory: Recall 3 objects immediate and 3 min later         Cranial Nerves - CN 2-12 intact     Motor -                    LEFT    UE - ShAB 5/5, EF 5/5, EE 5/5, WE 5/5,  5/5                    RIGHT UE - ShAB 5/5, EF 5/5, EE 5/5, WE 5/5,  5/5                    LEFT    LE - HF 5/5, KE 5/5, DF 5/5, PF 5/5                    RIGHT LE - HF 5/5, KE 5/5, DF 5/5, PF 5/5        Sensory - Intact to LT     Reflexes - DTR Intact, No primitive reflex     Coordination - FTN intact     Tone - normal  Psychiatric - Mood stable, Affect WNL  Skin: Intact  Wounds: None Present Vital Signs  T(C): 36.8 (02-03-21 @ 13:24), Max: 37.2 (02-03-21 @ 00:10)  HR: 78 (02-03-21 @ 13:24) (47 - 79)  BP: 118/77 (02-03-21 @ 13:24) (118/77 - 136/77)  RR: 18 (02-03-21 @ 13:24) (18 - 18)  SpO2: 94% (02-03-21 @ 13:24) (91% - 96%)    Gen - NAD, Comfortable  HEENT - NCAT, EOMI, MMM  Neck - Supple, No limited ROM  Pulm - CTAB, No wheeze, No rhonchi, No crackles  Cardiovascular - RRR, S1S2, No m/r/g  Abdomen - Soft, NT/ND, +BS  Extremities - No C/C/E, No calf tenderness  Neuro-     Cognitive - AAOx4 to person, place, time and situation. Able to name days of week in order and backwards. Able to perform serial 7's with minimal errors     Communication - Fluent, +dysarthria     Attention: Intact      Judgement: Good evidence of judgement     Memory: Recall 3 objects immediate and 3 min later         Cranial Nerves - CN 2-12 intact     Motor -                    LEFT    UE - ShAB 3/5, EF 3/5, EE 3/5, WE 2/5,  2/5                    RIGHT UE - ShAB 5/5, EF 5/5, EE 5/5, WE 5/5,  5/5                    LEFT    LE - HF 4/5, KE 5/5, DF 5/5, PF 5/5                    RIGHT LE - HF 5/5, KE 5/5, DF 5/5, PF 5/5        Sensory - Intact to LT     Reflexes - DTR Intact, No primitive reflex, BL positive Hoffmans     Coordination - mild L FTN dysmetria     Tone - normal  Psychiatric - Mood stable, Affect WNL  Skin: Intact  Wounds: None Present Vital Signs  T(C): 36.8 (02-03-21 @ 13:24), Max: 37.2 (02-03-21 @ 00:10)  HR: 78 (02-03-21 @ 13:24) (47 - 79)  BP: 118/77 (02-03-21 @ 13:24) (118/77 - 136/77)  RR: 18 (02-03-21 @ 13:24) (18 - 18)  SpO2: 94% (02-03-21 @ 13:24) (91% - 96%)    Gen - NAD, Comfortable  HEENT - NCAT, EOMI, MMM  Neck - Supple, No limited ROM  Pulm - CTAB, No wheeze, No rhonchi, No crackles  Cardiovascular - RRR, S1S2, No m/r/g  Abdomen - Soft, NT/ND, +BS  Extremities - No C/C/E, No calf tenderness  Neuro-     Cognitive - AAOx4 to person, place, time and situation. Able to name days of week in order and backwards. Able to perform serial 7's with minimal errors     Communication - Fluent, +dysarthria     Attention: Intact      Judgement: Good evidence of judgement     Memory: Recall 3 objects immediate and 3 min later         Cranial Nerves - CN 2-12 intact     Motor -                    LEFT    UE - ShAB 3/5, EF 3/5, EE 3/5, WE 2/5,  2/5                    RIGHT UE - ShAB 5/5, EF 5/5, EE 5/5, WE 5/5,  5/5                    LEFT    LE - HF 4/5, KE 5/5, DF 5/5, PF 5/5                    RIGHT LE - HF 5/5, KE 5/5, DF 5/5, PF 5/5        Sensory - Intact to LT     Reflexes - DTR Intact, No primitive reflex, BL positive Hoffmans     Coordination - L FTN dysmetria     Tone - normal  Psychiatric - Mood stable, Affect WNL  Skin: Intact  Wounds: None Present

## 2021-02-03 NOTE — DISCHARGE NOTE PROVIDER - NSDCCPCAREPLAN_GEN_ALL_CORE_FT
PRINCIPAL DISCHARGE DIAGNOSIS  Diagnosis: CVA (cerebral vascular accident)  Assessment and Plan of Treatment:       SECONDARY DISCHARGE DIAGNOSES  Diagnosis: Parotid nodule  Assessment and Plan of Treatment:

## 2021-02-03 NOTE — H&P ADULT - NSHPSOCIALHISTORY_GEN_ALL_CORE
Pt reports he lives with spouse and children in private house with 5 steps to enter with bilateral hand rails & 1 flight of stairs to second floor bedroom.     Prior Level of Function: independent with ADLs and mobility without assistive device prior to admission.    Current Level of Function:  Bed-chair: Huma  Transfers: Huma  Gait: CG 40' x2, 1 person assist  Dressing: supervision Tobacco: current daily smoker, 1ppd x"many many years"  EtOH: denies  Drugs: denies    Pt reports he lives with spouse and children in private house with 5 steps to enter with bilateral hand rails & 1 flight of stairs to second floor bedroom.     Prior Level of Function: independent with ADLs and mobility without assistive device prior to admission. Worked as a doorman, +drives    Current Level of Function:  Bed-chair: Huma  Transfers: Huma  Gait: CG 40' x2, 1 person assist  Dressing: supervision Tobacco: current daily smoker, 1ppd x"many many years"  EtOH: denies  Drugs: denies    Pt reports he lives with spouse and children in private house in [place] with 5 steps to enter with bilateral hand rails & 1 flight of stairs to second floor bedroom.       Prior Level of Function: independent with ADLs and mobility without assistive device prior to admission. Worked as a doorman, +drives    Current Level of Function:  Bed-chair: Huma  Transfers: Huma  Gait: CG 40' x2, 1 person assist  Dressing: supervision

## 2021-02-03 NOTE — H&P ADULT - ATTENDING COMMENTS
Pt. seen with resident.  Agree with documentation above as per resident with amendments made as appropriate. Patient medically stable. Appropriate for acute rehab.

## 2021-02-03 NOTE — PATIENT PROFILE ADULT - FUNCTIONAL SCREEN CURRENT LEVEL: COMMUNICATION, MLM
Alert,and oriented to baseline; no acute distress noted. [facility] [place] [facility]   0 = understands/communicates without difficulty

## 2021-02-03 NOTE — H&P ADULT - HISTORY OF PRESENT ILLNESS
61yo male with no pertinent PMH presented with stroke to [facility] 1/29/21. Per daughter, patient [facility] He was seen in ER, had CT and covid test, then he was signed out and took a flight to [place]. Patient just got off plane and came here. Pt with left arm weakness and dysarthria, but denies headache, cp, sob, dizziness, NV. Patient was found to have acute and/or recent subacute R MCA stroke and a parotid lesion.    Patient was medically stable for discharge to [facility] for multidisciplinary acute rehab 2/3/21.     61yo male with no pertinent PMH presented with stroke to [facility] 1/29/21. Per daughter, patient [facility] He [facility], had CT and covid test, then he was signed out and took a flight to [place]. Patient got off plane and presented to [facility] ED. Pt with left arm weakness and dysarthria, but denies headache, cp, sob, dizziness, NV. Patient was found to have MRI findings of acute and/or recent subacute R MCA stroke and a parotid lesion. Patient was started on ASA and statin.     Patient was medically stable for discharge to [facility] for multidisciplinary acute rehab 2/3/21.

## 2021-02-03 NOTE — DISCHARGE NOTE PROVIDER - CARE PROVIDER_API CALL
Alfredo Mota  NEUROLOGY  [address], [place] [id number]  Phone: [phone]  Fax: [phone]  Follow Up Time:

## 2021-02-03 NOTE — DISCHARGE NOTE PROVIDER - NSDCQMMRS_NEU_ALL_CORE
1 - No significant disability. Able to carry out all usual activities, despite some symptoms Discharged

## 2021-02-03 NOTE — H&P ADULT - NSHPREVIEWOFSYSTEMS_GEN_ALL_CORE
REVIEW OF SYSTEMS  Constitutional: No fever, No Chills, No fatigue  HEENT: No eye pain, No visual disturbances, No difficulty hearing, No Dysphagia   Pulm: No cough,  No shortness of breath  Cardio: No chest pain, No palpitations  GI:  No abdominal pain, No nausea, No vomiting, No diarrhea, No constipation, No incontinence, Last Bowel Movement on   : No dysuria, No frequency, No hematuria, No incontinence  Neuro: No headaches, No memory loss, +loss of strength, No numbness, No tremors  Skin: No itching, No rashes, No lesions   Endo: No temperature intolerance  MSK: No joint pain, No joint swelling, No muscle pain, No Neck or back pain  Psych:  No depression, No anxiety REVIEW OF SYSTEMS  Constitutional: No fever, No Chills, No fatigue  HEENT: No eye pain, No visual disturbances, No difficulty hearing, No Dysphagia   Pulm: No cough,  No shortness of breath  Cardio: No chest pain, No palpitations  GI:  No abdominal pain, No nausea, No vomiting, No diarrhea, No constipation, No incontinence, Last Bowel Movement on 1/30  : No dysuria, No frequency, No hematuria, No incontinence  Neuro: No headaches, No memory loss, +loss of strength, No numbness, No tremors  Skin: No itching, No rashes, No lesions   Endo: No temperature intolerance  MSK: No joint pain, No joint swelling, No muscle pain, No Neck or back pain  Psych:  No depression, No anxiety

## 2021-02-03 NOTE — PROGRESS NOTE ADULT - SUBJECTIVE AND OBJECTIVE BOX
CHIEF COMPLAINT/INTERVAL HISTORY:    Patient is a 60y old  Male who presents with a chief complaint of CVA with left HP (29 Jan 2021 18:26)      HPI:  61yo male no pertinent PMH presents with stroke.  Per daughter, pt had symptoms starting yesterday at 10am in the [place]. He was seen in ER, had CT and covid test and he signed him out and took a flight to [place].     Pt just got off plane and came here. Pt with left arm weakness and dysarthria, but denies headache, cp, sob, dizziness, NV.  (29 Jan 2021 18:26)      SUBJECTIVE & OBJECTIVE: Pt seen and examined at bedside.   c/o left arm weakness. denies CP, sob, abd pain, nausea, vomiting/ HA.    Vital Signs Last 24 Hrs  T(C): 36.5 (30 Jan 2021 04:15), Max: 37.1 (29 Jan 2021 19:37)  T(F): 97.7 (30 Jan 2021 04:15), Max: 98.7 (29 Jan 2021 19:37)  HR: 46 (30 Jan 2021 04:15) (46 - 68)  BP: 145/75 (30 Jan 2021 04:15) (127/72 - 155/89)  BP(mean): --  ABP: --  ABP(mean): --  RR: 17 (30 Jan 2021 04:15) (14 - 22)  SpO2: 94% (30 Jan 2021 04:15) (94% - 98%)        MEDICATIONS  (STANDING):  aspirin enteric coated 81 milliGRAM(s) Oral daily  atorvastatin 40 milliGRAM(s) Oral at bedtime  enoxaparin Injectable 40 milliGRAM(s) SubCutaneous daily  sodium chloride 0.45%. 1000 milliLiter(s) (75 mL/Hr) IV Continuous <Continuous>        PHYSICAL EXAM:    GENERAL: NAD, well-developed  HEAD:  Atraumatic, Normocephalic  EYES: EOMI, PERRLA, conjunctiva and sclera clear  ENMT: Moist mucous membranes  NECK: Supple, No JVD  NERVOUS SYSTEM:  Alert & Oriented X3, Motor Strength 4/5 LUE, mild flattening of left nasolabial fold.  CHEST/LUNG: Clear to auscultation bilaterally; No rales, rhonchi, wheezing, or rubs  HEART: Regular rate and rhythm  ABDOMEN: Soft, Nontender, Nondistended; Bowel sounds present  EXTREMITIES:  no cyanosis, or edema    LABS:                        13.8   8.89  )-----------( 216      ( 29 Jan 2021 16:20 )             41.0     01-30    144  |  114<H>  |  9   ----------------------------<  86  3.8   |  25  |  0.59    Ca    8.5      30 Jan 2021 09:55    TPro  7.5  /  Alb  2.9<L>  /  TBili  0.3  /  DBili  x   /  AST  16  /  ALT  22  /  AlkPhos  92  01-29    PT/INR - ( 29 Jan 2021 16:20 )   PT: 13.0 sec;   INR: 1.13 ratio         PTT - ( 29 Jan 2021 16:20 )  PTT:29.1 sec      
                     Patient: SHAWNA LAURENT [id number] 60y Male                           Internal Medicine Hospitalist Progress Note    No complaints.  LUE / LLE weakness is improving.  Reports speech is better.  No new complaints.     ____________________PHYSICAL EXAM:  GENERAL:  NAD Alert and Oriented x 3   HEENT: NCAT  CARDIOVASCULAR:  S1, S2  LUNGS: CTAB  ABDOMEN:  soft, (-) tenderness, (-) distension, (+) bowel sounds, (-) guarding, (-) rebound (-) rigidity  EXTREMITIES:  no cyanosis / clubbing / edema.   NEURO: LUE / LLE strength 5-/5.  RUE / RLE strength 5/5.  Speech intact.   ____________________     VITALS:  Vital Signs Last 24 Hrs  T(C): 36.8 (2021 10:19), Max: 37.2 (2021 00:10)  T(F): 98.3 (2021 10:19), Max: 98.9 (2021 00:10)  HR: 49 (2021 10:19) (47 - 79)  BP: 122/72 (2021 10:19) (120/77 - 136/77)  BP(mean): --  RR: 18 (2021 10:19) (18 - 18)  SpO2: 91% (2021 10:19) (91% - 96%) Daily     Daily Weight in k.6 (2021 05:11)  CAPILLARY BLOOD GLUCOSE        I&O's Summary    2021 07:01  -  2021 07:00  --------------------------------------------------------  IN: 240 mL / OUT: 700 mL / NET: -460 mL          BACKGROUND:  HEALTH ISSUES - PROBLEM Dx:        Allergies    No Known Allergies    Intolerances      PAST MEDICAL & SURGICAL HISTORY:  No pertinent past medical history    No significant past surgical history          LABS:                        MEDICATIONS:  MEDICATIONS  (STANDING):  aspirin enteric coated 81 milliGRAM(s) Oral daily  atorvastatin 40 milliGRAM(s) Oral at bedtime  enoxaparin Injectable 40 milliGRAM(s) SubCutaneous daily    MEDICATIONS  (PRN):  guaiFENesin   Syrup  (Sugar-Free) 200 milliGRAM(s) Oral every 6 hours PRN Cough      TTE: EF 60-65%.      MRI Neck with and without contrast: Nonspecific intraparotid nodules most consistent with adenopathy. Neoplastic and infectious or reactive process or less likely Warthin's tumors considered. Motion limited exam    MRI Head: 1)  Acute and/or recent subacute right MCA territory infarct. Underlying chronic ischemic changes also noted with mild volume loss and involutional change. 2)  no hemorrhagic lesion or mass. No gross large vessel occlusion..    CTA NECK: Focal moderate narrowing of the proximal left cervical ICA. Minimal narrowing at the origin of the right cervical ICA. Mild narrowing of the distal left V2 segment.    CTA HEAD: Tapering of the right mid M1 segment to severe narrowing at the level of the right MCA bifurcation.  Multifocal mild narrowing intracranial ICAs bilaterally.
CHIEF COMPLAINT/INTERVAL HISTORY:    Patient is a 60y old  Male who presents with a chief complaint of CVA with left HP (30 Jan 2021 20:02)      HPI:  61yo male no pertinent PMH presents with stroke.  Per daughter, pt had symptoms starting yesterday at 10am in the [place]. He was seen in ER, had CT and covid test and he signed him out and took a flight to [place].     Pt just got off plane and came here. Pt with left arm weakness and dysarthria, but denies headache, cp, sob, dizziness, NV.  (29 Jan 2021 18:26)      SUBJECTIVE & OBJECTIVE: Pt seen and examined at bedside.   offers no complaints.  no new weakness, tingling, numbness or speech difficulty.  tolerating mechanical soft diet.       Vital Signs Last 24 Hrs  T(C): 36.8 (31 Jan 2021 10:03), Max: 37.1 (30 Jan 2021 16:06)  T(F): 98.2 (31 Jan 2021 10:03), Max: 98.8 (30 Jan 2021 16:06)  HR: 64 (31 Jan 2021 10:03) (49 - 69)  BP: 128/72 (31 Jan 2021 10:03) (123/71 - 152/83)  BP(mean): --  ABP: --  ABP(mean): --  RR: 18 (31 Jan 2021 10:03) (18 - 20)  SpO2: 93% (31 Jan 2021 10:03) (93% - 94%)        MEDICATIONS  (STANDING):  aspirin enteric coated 81 milliGRAM(s) Oral daily  atorvastatin 40 milliGRAM(s) Oral at bedtime  enoxaparin Injectable 40 milliGRAM(s) SubCutaneous daily  sodium chloride 0.45%. 1000 milliLiter(s) (75 mL/Hr) IV Continuous <Continuous>        PHYSICAL EXAM:  GENERAL: NAD, well-developed  HEAD:  Atraumatic, Normocephalic  EYES: EOMI, PERRLA, conjunctiva and sclera clear  ENMT: Moist mucous membranes  NECK: Supple, No JVD  NERVOUS SYSTEM:  Alert & Oriented x 3, left sided weakness, LUE more weak than LLE  CHEST/LUNG: Clear to auscultation bilaterally; No rales, rhonchi, wheezing, or rubs  HEART: Regular rate and rhythm, mild naif  ABDOMEN: Soft, Nontender, Nondistended; Bowel sounds present  EXTREMITIES:  no cyanosis, or edema,       
Patient is a 60y old  Male who presents with a chief complaint of CVA with left HP (31 Jan 2021 19:07)      INTERVAL HPI/ OVERNIGHT EVENTS: Pt was seen and examined at bedside today, No significant overnight events, pt denies any new neurological changes, no change in left sided weakness or slurred speech      MEDICATIONS  (STANDING):  aspirin enteric coated 81 milliGRAM(s) Oral daily  atorvastatin 40 milliGRAM(s) Oral at bedtime  enoxaparin Injectable 40 milliGRAM(s) SubCutaneous daily    MEDICATIONS  (PRN):  guaiFENesin   Syrup  (Sugar-Free) 200 milliGRAM(s) Oral every 6 hours PRN Cough      Allergies    No Known Allergies    Intolerances        REVIEW OF SYSTEMS:    Unable to examine due to [ ] Encephalopathy [ ] Advanced Dementia [ ] Expressive Aphasia [ ] Non-verbal patient    CONSTITUTIONAL: No fever, NO generalized weakness/Fatigue, No weight loss  EYES: No eye pain, visual disturbances, or discharge  ENMT:  No difficulty hearing, tinnitus, vertigo; No sinus or throat pain  NECK: No pain or stiffness  RESPIRATORY: No shortness of breath,  cough, wheezing, sputum or hemoptysis   CARDIOVASCULAR: No chest pain, palpitations, or leg swelling  GASTROINTESTINAL: No abdominal pain. No nausea, vomiting, diarrhea or constipation. No melena or hematochezia.  GENITOURINARY: No dysuria, frequency, hematuria, or incontinence  NEUROLOGICAL: left sided weakness, slurred speech, No headaches, Dizziness, memory loss, loss of strength, numbness, or tremors  SKIN: No itching, burning, rashes, or lesions   MUSCULOSKELETAL: No joint pain or swelling; No muscle, back, or extremity pain  PSYCHIATRIC: No depression, anxiety, mood swings, or difficulty sleeping  HEME/LYMPH: No easy bruising, or bleeding gums      Vital Signs Last 24 Hrs  T(C): 36.8 (01 Feb 2021 10:29), Max: 37.2 (01 Feb 2021 00:44)  T(F): 98.2 (01 Feb 2021 10:29), Max: 99 (01 Feb 2021 00:44)  HR: 60 (01 Feb 2021 15:05) (45 - 63)  BP: 133/76 (01 Feb 2021 15:05) (130/75 - 159/67)  BP(mean): --  RR: 17 (01 Feb 2021 10:29) (17 - 18)  SpO2: 96% (01 Feb 2021 15:05) (92% - 96%)    PHYSICAL EXAM:  GENERAL: NAD, well-developed, well-groomed  HEAD:  Atraumatic, Normocephalic  EYES: conjunctiva and sclera clear  ENMT: Moist mucous membranes  NECK: Supple, No JVD, Normal thyroid  CHEST/LUNG: Clear to Auscultation bilaterally; No rales, rhonchi, wheezing, or rubs  HEART: Regular rate and rhythm; No murmurs, rubs, or gallops  ABDOMEN: Soft, Nontender, Nondistended; Bowel sounds present  EXTREMITIES:  2+ Peripheral Pulses, No clubbing, cyanosis, or edema  SKIN: No rashes or lesions  NERVOUS SYSTEM:  Alert & Oriented X3, Good concentration; slurred speech, left facial droop, 4/5 Left Motor Strength 5/5 UE and LE, sensation intact throughout     LABS:              CAPILLARY BLOOD GLUCOSE              RADIOLOGY & ADDITIONAL TESTS:          Imaging Personally Reviewed:  [ ] YES  [ ] NO    Consultant(s) Notes Reviewed:  [x ] YES  [ ] NO    Care Discussed with Consultants/Other Providers [x ] YES  [ ] NO
Patient is a 60y old  Male who presents with a chief complaint of CVA with left HP (01 Feb 2021 21:40)      INTERVAL HPI/ OVERNIGHT EVENTS: Pt was seen and examined at bedside today, No significant overnight events, pt admits that his speech slurring is improving along with his left sided weakness      MEDICATIONS  (STANDING):  aspirin enteric coated 81 milliGRAM(s) Oral daily  atorvastatin 40 milliGRAM(s) Oral at bedtime  enoxaparin Injectable 40 milliGRAM(s) SubCutaneous daily    MEDICATIONS  (PRN):  guaiFENesin   Syrup  (Sugar-Free) 200 milliGRAM(s) Oral every 6 hours PRN Cough      Allergies    No Known Allergies    Intolerances        REVIEW OF SYSTEMS:    Unable to examine due to [ ] Encephalopathy [ ] Advanced Dementia [ ] Expressive Aphasia [ ] Non-verbal patient    CONSTITUTIONAL: No fever, NO generalized weakness/Fatigue, No weight loss  EYES: No eye pain, visual disturbances, or discharge  ENMT:  No difficulty hearing, tinnitus, vertigo; No sinus or throat pain  NECK: No pain or stiffness  RESPIRATORY: No shortness of breath,  cough, wheezing, sputum or hemoptysis   CARDIOVASCULAR: No chest pain, palpitations, or leg swelling  GASTROINTESTINAL: No abdominal pain. No nausea, vomiting, diarrhea or constipation. No melena or hematochezia.  GENITOURINARY: No dysuria, frequency, hematuria, or incontinence  NEUROLOGICAL: left sided weakness, slurred speech, No headaches, Dizziness, memory loss, loss of strength, numbness, or tremors  SKIN: No itching, burning, rashes, or lesions   MUSCULOSKELETAL: No joint pain or swelling; No muscle, back, or extremity pain  PSYCHIATRIC: No depression, anxiety, mood swings, or difficulty sleeping  HEME/LYMPH: No easy bruising, or bleeding gums    Vital Signs Last 24 Hrs  T(C): 37.1 (02 Feb 2021 16:25), Max: 37.1 (02 Feb 2021 16:25)  T(F): 98.8 (02 Feb 2021 16:25), Max: 98.8 (02 Feb 2021 16:25)  HR: 77 (02 Feb 2021 16:25) (50 - 79)  BP: 120/77 (02 Feb 2021 16:25) (120/77 - 134/85)  BP(mean): --  RR: 18 (02 Feb 2021 16:25) (18 - 20)  SpO2: 96% (02 Feb 2021 16:25) (92% - 96%)    PHYSICAL EXAM:  GENERAL: NAD, well-developed, well-groomed  HEAD:  Atraumatic, Normocephalic  EYES: conjunctiva and sclera clear  ENMT: Moist mucous membranes  NECK: Supple, No JVD, Normal thyroid  CHEST/LUNG: Clear to Auscultation bilaterally; No rales, rhonchi, wheezing, or rubs  HEART: Regular rate and rhythm; No murmurs, rubs, or gallops  ABDOMEN: Soft, Nontender, Nondistended; Bowel sounds present  EXTREMITIES:  2+ Peripheral Pulses, No clubbing, cyanosis, or edema  SKIN: No rashes or lesions  NERVOUS SYSTEM:  Alert & Oriented X3, Good concentration; slurred speech, left facial droop, 4/5 Left Motor Strength 5/5 UE and LE, sensation intact throughout     LABS:              CAPILLARY BLOOD GLUCOSE              RADIOLOGY & ADDITIONAL TESTS:          Imaging Personally Reviewed:  [ ] YES  [ ] NO    Consultant(s) Notes Reviewed:  [ ] YES  [ ] NO    Care Discussed with Consultants/Other Providers [x ] YES  [ ] NO

## 2021-02-03 NOTE — DISCHARGE NOTE PROVIDER - NSDCMRMEDTOKEN_GEN_ALL_CORE_FT
aspirin 81 mg oral delayed release tablet: 1 tab(s) orally once a day  atorvastatin 40 mg oral tablet: 1 tab(s) orally once a day (at bedtime)

## 2021-02-03 NOTE — DISCHARGE NOTE PROVIDER - NSDCFUADDINST_GEN_ALL_CORE_FT
It is important to see your primary physician as well as the physicians noted below within the next week to perform a comprehensive medical review.  Call their offices for an appointment as soon as you leave the hospital.  If you do not have a primary physician, contact the [facility] Physician Referral Service at [phone].  To obtain your results, you can access the [facility] Patient Portal at [email or web address].  Your medical issues appear to be stable at this time, but if your symptoms recur or worsen, contact your physicians and/or return to the hospital if necessary.  If you encounter any issues or questions with your medication, call your physicians before stopping the medication.  Do not drive.  Limit your diet to 2 grams of sodium daily.

## 2021-02-03 NOTE — H&P ADULT - NSICDXFAMILYHX_GEN_ALL_CORE_FT
FAMILY HISTORY:  Family history of cerebrovascular accident (CVA) in father  Family history of diabetes mellitus

## 2021-02-03 NOTE — DISCHARGE NOTE NURSING/CASE MANAGEMENT/SOCIAL WORK - PATIENT PORTAL LINK FT
You can access the FollowMyHealth Patient Portal offered by [facility] by registering at the following website: [email or web address]. By joining [facility]’s FollowMyHealth portal, you will also be able to view your health information using other applications (apps) compatible with our system.

## 2021-02-03 NOTE — DISCHARGE NOTE PROVIDER - HOSPITAL COURSE
59yo M denies significant PMH presented with L sided weakness, found to have acute / subacute R MCA infarct.  Symptoms improving. Stable for Acute rehab placement.      #Acute / subacute CVA - MRI Head: 1)  Acute and/or recent subacute right MCA territory infarct. Underlying chronic ischemic changes also noted with mild volume loss and involutional change..  2)  no hemorrhagic lesion or mass. No gross large vessel occlusion. Echo: benign.  Symptoms improving.  Cont w/ Aspirin and statin Rehab on discharge     #Parotid lesion - MRI showed: Nonspecific intraparotid nodules most consistent with adenopathy.  Discussed with patient to followup with PMD to monitor for progression.    Disposition: Stable for discharge.  Outpatient followup discussed.  Total time spent on discharge is  45 minutes.

## 2021-02-03 NOTE — PROGRESS NOTE ADULT - REASON FOR ADMISSION
CVA with left HP

## 2021-02-03 NOTE — H&P ADULT - NSHPLABSRESULTS_GEN_ALL_CORE
RECENT LABS  A1C with Estimated Average Glucose Result: 5.2:SARS-CoV-2 Result: NotDetecLipid Profile in AM (01.30.21 @ 12:01)   Cholesterol, Serum: 128 mg/dL   Triglycerides, Serum: 106 mg/dL   HDL Cholesterol, Serum: 21 mg/dL   Non HDL Cholesterol: 107Basic Metabolic Panel - STAT (01.30.21 @ 09:55)   Sodium, Serum: 144 mmol/L   Potassium, Serum: 3.8 mmol/L   Chloride, Serum: 114 mmol/L   Carbon Dioxide, Serum: 25 mmol/L   Anion Gap, Serum: 5 mmol/L   Blood Urea Nitrogen, Serum: 9 mg/dL   Creatinine, Serum: 0.59 mg/dL   Glucose, Serum: 86 mg/dL   Calcium, Total Serum: 8.5 mg/dL Complete Blood Count + Automated Diff (01.29.21 @ 16:20)   WBC Count: 8.89 K/uL   RBC Count: 4.31 M/uL   Hemoglobin: 13.8 g/dL   Hematocrit: 41.0 %   Mean Cell Volume: 95.1 fl   Mean Cell Hemoglobin: 32.0 pg   Mean Cell Hemoglobin Conc: 33.7 gm/dL   Red Cell Distrib Width: 13.6 %   Platelet Count - Automated: 216 K/uL     IMAGING:  TTE:    1. Left ventricular ejection fraction, by visual estimation, is 60 to 65%.   2. Normal global left ventricular systolic function.   3. Normal left ventricular internal cavity size.   4. Spectral Doppler shows impaired relaxation pattern of left ventricular myocardial filling (Grade I diastolic dysfunction).   5. Normal right ventricular size and function.   6. Normal left atrial size.   7. Normal right atrial size.   8. There is no evidence of pericardial effusion.   9. Structurally normal mitral valve, with normal leaflet excursion.  10. Trace mitral valve regurgitation.  11. Normal trileaflet aortic valve with normal opening.  12. Possible Lambl's excrescense visualized on aortic valve leaflet tips representing a normal variant    MRI Neck with and without contrast: Nonspecific intraparotid nodules most consistent with adenopathy. Neoplastic and infectious or reactive process or less likely Warthin's tumors considered. Motion limited exam    MRI Head: 1)  Acute and/or recent subacute right MCA territory infarct. Underlying chronic ischemic changes also noted with mild volume loss and involutional change. 2)  no hemorrhagic lesion or mass. No gross large vessel occlusion..    CTA NECK: Focal moderate narrowing of the proximal left cervical ICA. Minimal narrowing at the origin of the right cervical ICA. Mild narrowing of the distal left V2 segment.    CTA HEAD: Tapering of the right mid M1 segment to severe narrowing at the level of the right MCA bifurcation.  Multifocal mild narrowing intracranial ICAs bilaterally.

## 2021-02-04 LAB
ALBUMIN SERPL ELPH-MCNC: 2.9 G/DL — LOW (ref 3.3–5)
ALP SERPL-CCNC: 95 U/L — SIGNIFICANT CHANGE UP (ref 40–120)
ALT FLD-CCNC: 46 U/L — HIGH (ref 10–45)
ANION GAP SERPL CALC-SCNC: 8 MMOL/L — SIGNIFICANT CHANGE UP (ref 5–17)
AST SERPL-CCNC: 32 U/L — SIGNIFICANT CHANGE UP (ref 10–40)
BILIRUB SERPL-MCNC: 0.6 MG/DL — SIGNIFICANT CHANGE UP (ref 0.2–1.2)
BUN SERPL-MCNC: 14 MG/DL — SIGNIFICANT CHANGE UP (ref 7–23)
CALCIUM SERPL-MCNC: 9 MG/DL — SIGNIFICANT CHANGE UP (ref 8.4–10.5)
CHLORIDE SERPL-SCNC: 107 MMOL/L — SIGNIFICANT CHANGE UP (ref 96–108)
CO2 SERPL-SCNC: 27 MMOL/L — SIGNIFICANT CHANGE UP (ref 22–31)
CREAT SERPL-MCNC: 0.97 MG/DL — SIGNIFICANT CHANGE UP (ref 0.5–1.3)
GLUCOSE SERPL-MCNC: 90 MG/DL — SIGNIFICANT CHANGE UP (ref 70–99)
HCT VFR BLD CALC: 43.6 % — SIGNIFICANT CHANGE UP (ref 39–50)
HGB BLD-MCNC: 14.8 G/DL — SIGNIFICANT CHANGE UP (ref 13–17)
MCHC RBC-ENTMCNC: 32.7 PG — SIGNIFICANT CHANGE UP (ref 27–34)
MCHC RBC-ENTMCNC: 33.9 GM/DL — SIGNIFICANT CHANGE UP (ref 32–36)
MCV RBC AUTO: 96.5 FL — SIGNIFICANT CHANGE UP (ref 80–100)
NRBC # BLD: 0 /100 WBCS — SIGNIFICANT CHANGE UP (ref 0–0)
PLATELET # BLD AUTO: 230 K/UL — SIGNIFICANT CHANGE UP (ref 150–400)
POTASSIUM SERPL-MCNC: 4.1 MMOL/L — SIGNIFICANT CHANGE UP (ref 3.5–5.3)
POTASSIUM SERPL-SCNC: 4.1 MMOL/L — SIGNIFICANT CHANGE UP (ref 3.5–5.3)
PROT SERPL-MCNC: 7.6 G/DL — SIGNIFICANT CHANGE UP (ref 6–8.3)
RBC # BLD: 4.52 M/UL — SIGNIFICANT CHANGE UP (ref 4.2–5.8)
RBC # FLD: 13.3 % — SIGNIFICANT CHANGE UP (ref 10.3–14.5)
SARS-COV-2 RNA SPEC QL NAA+PROBE: SIGNIFICANT CHANGE UP
SODIUM SERPL-SCNC: 142 MMOL/L — SIGNIFICANT CHANGE UP (ref 135–145)
WBC # BLD: 10.98 K/UL — HIGH (ref 3.8–10.5)
WBC # FLD AUTO: 10.98 K/UL — HIGH (ref 3.8–10.5)

## 2021-02-04 PROCEDURE — 99232 SBSQ HOSP IP/OBS MODERATE 35: CPT

## 2021-02-04 PROCEDURE — 99223 1ST HOSP IP/OBS HIGH 75: CPT

## 2021-02-04 RX ORDER — NICOTINE POLACRILEX 2 MG
1 GUM BUCCAL DAILY
Refills: 0 | Status: DISCONTINUED | OUTPATIENT
Start: 2021-02-04 | End: 2021-02-13

## 2021-02-04 RX ORDER — POLYETHYLENE GLYCOL 3350 17 G/17G
17 POWDER, FOR SOLUTION ORAL DAILY
Refills: 0 | Status: DISCONTINUED | OUTPATIENT
Start: 2021-02-04 | End: 2021-02-12

## 2021-02-04 RX ADMIN — ATORVASTATIN CALCIUM 40 MILLIGRAM(S): 80 TABLET, FILM COATED ORAL at 21:06

## 2021-02-04 RX ADMIN — POLYETHYLENE GLYCOL 3350 17 GRAM(S): 17 POWDER, FOR SOLUTION ORAL at 12:04

## 2021-02-04 RX ADMIN — POLYETHYLENE GLYCOL 3350 17 GRAM(S): 17 POWDER, FOR SOLUTION ORAL at 08:40

## 2021-02-04 RX ADMIN — Medication 1 APPLICATION(S): at 12:04

## 2021-02-04 RX ADMIN — ENOXAPARIN SODIUM 40 MILLIGRAM(S): 100 INJECTION SUBCUTANEOUS at 12:04

## 2021-02-04 RX ADMIN — Medication 81 MILLIGRAM(S): at 12:04

## 2021-02-04 RX ADMIN — SENNA PLUS 2 TABLET(S): 8.6 TABLET ORAL at 21:06

## 2021-02-04 NOTE — PROGRESS NOTE ADULT - SUBJECTIVE AND OBJECTIVE BOX
Patient is a 60y old  Male who presents with a chief complaint of R MCA stroke  01.1 Left Body Involvement (Right Brain) (03 Feb 2021 14:19)      HPI:  59yo male with no pertinent PMH presented with stroke to [facility] 1/29/21. Per daughter, patient had symptoms starting 1/28/21 at 10am in the [facility]. He was seen in ER, had CT and covid test, then he was signed out and took a flight to [place]. Patient got off plane and presented to [facility] ED. Pt with left arm weakness and dysarthria, but denies headache, cp, sob, dizziness, NV. Patient was found to have MRI findings of acute and/or recent subacute R MCA stroke and a parotid lesion. Patient was started on ASA and statin.     Patient was medically stable for discharge to [place] for multidisciplinary acute rehab 2/3/21.     (03 Feb 2021 14:19)        SUBJECTIVE: Patient seen and examined. No acute overnight events, slept well. No other complaints.       REVIEW OF SYSTEMS  Constitutional: No fever, No fatigue  HEENT: No eye pain, No visual disturbances  Pulm: No cough,  No shortness of breath  Cardio: No chest pain  GI:  No abdominal pain, No nausea, No vomiting, No diarrhea, +constipation last BM 1/30  Neuro: No headaches, +memory loss, +loss of strength  MSK: No joint pain, No joint swelling, No muscle pain, No Neck or back pain  Psych:  No depression, No anxiety      VITALS  60y  Vital Signs Last 24 Hrs  T(C): 36.6 (04 Feb 2021 07:53), Max: 36.8 (03 Feb 2021 10:19)  T(F): 97.8 (04 Feb 2021 07:53), Max: 98.3 (03 Feb 2021 10:19)  HR: 62 (04 Feb 2021 07:53) (49 - 78)  BP: 125/82 (04 Feb 2021 07:53) (118/77 - 125/82)  BP(mean): --  RR: 16 (04 Feb 2021 07:53) (14 - 18)  SpO2: 98% (04 Feb 2021 07:53) (91% - 98%)  Daily Height in cm: 170.18 (03 Feb 2021 17:50)    Daily           Gen - NAD, Comfortable  HEENT - NCAT, EOMI, MMM  Pulm - CTAB, No wheeze  Cardiovascular - RRR, S1S2, No m/r/g  Abdomen - Soft, NT/ND, +BS  Extremities - No C/C/E, No calf tenderness  Neuro-     Cognitive - AAOx4 to person, place, time and situation.     Communication - Fluent, +dysarthria         Cranial Nerves - CN 2-12 intact     Motor -                    LEFT    UE - ShAB 3/5, EF 3/5, EE 3/5, WE 2/5,  2/5                    RIGHT UE - ShAB 5/5, EF 5/5, EE 5/5, WE 5/5,  5/5                    LEFT    LE - HF 4/5, KE 5/5, DF 5/5, PF 5/5                    RIGHT LE - HF 5/5, KE 5/5, DF 5/5, PF 5/5        Reflexes - DTR Intact, No primitive reflex, BL positive Hoffmans     Coordination - L FTN dysmetria  Psychiatric - Mood stable, Affect WNL  Skin: Intact  Wounds: None Present          RECENT LABS:                        14.8   10.98 )-----------( 230      ( 04 Feb 2021 05:45 )             43.6     02-04    142  |  107  |  14  ----------------------------<  90  4.1   |  27  |  0.97    Ca    9.0      04 Feb 2021 05:45    TPro  7.6  /  Alb  2.9<L>  /  TBili  0.6  /  DBili  x   /  AST  32  /  ALT  46<H>  /  AlkPhos  95  02-04    LIVER FUNCTIONS - ( 04 Feb 2021 05:45 )  Alb: 2.9 g/dL / Pro: 7.6 g/dL / ALK PHOS: 95 U/L / ALT: 46 U/L / AST: 32 U/L / GGT: x                       MEDICATIONS:  MEDICATIONS  (STANDING):  AQUAPHOR (petrolatum Ointment) 1 Application(s) Topical daily  aspirin enteric coated 81 milliGRAM(s) Oral daily  atorvastatin 40 milliGRAM(s) Oral at bedtime  enoxaparin Injectable 40 milliGRAM(s) SubCutaneous daily  polyethylene glycol 3350 17 Gram(s) Oral daily  senna 2 Tablet(s) Oral at bedtime    MEDICATIONS  (PRN):  bisacodyl Suppository 10 milliGRAM(s) Rectal daily PRN Constipation  guaiFENesin   Syrup  (Sugar-Free) 200 milliGRAM(s) Oral every 6 hours PRN Cough

## 2021-02-04 NOTE — PROGRESS NOTE ADULT - ASSESSMENT
Assessment/Plan:  SHAWNA LAURENT is a 60y with no significant PMHx presented to [facility] 1/29/21 for stroke symptoms that began the day before in [place]. He signed out of ED, flew to [place], and went to [facility]. MRI showed R MCA stroke.       #R MCA CVA with Left sided weakness  - Continue ASA and Lipitor  - F/u neuro, Dr. Mota, outpatient  - [facility] comprehensive rehab program, PT/OT/SLP 3 hours a day, 5 days a week.  - Precautions: falls, aspiration    #Intraparotid Nodules  - F/u with PCP outpatient    GI/Bowel  - senna qhs, miralax  - dulcolax supp PRN    /Bladder:   - monitor voiding- PVRs  - Encourage timed voids every 4 hours while awake for independence and to promote continence during therapy.    Skin/Pressure Injury:   - At risk for pressure injury due to neurologic diagnosis and relative immobility.  - Skin assessment on admission: intact  - Turn every 2 hours while in bed, air mattress  - Soft heel protectors  - Skin barrier cream as needed  - Nursing to monitor skin Qshift    Diet/Dysphagia:  - Diet Consistency/Modifications: [facility] soft  - Aspiration Precautions  - SLP consult for swallow function evaluation and treatment    DVT ppx:  - Lovenox  - SCDs  ---------------  Outpatient Follow-up (Specialty/Name of physician):  Alfredo Mota  NEUROLOGY  [address] [place] [address] [phone]  Phone: [phone]  Fax: [phone]   Assessment/Plan:  SHAWNA LAURENT is a 60y with no significant PMHx presented to [facility] 1/29/21 for stroke symptoms that began the day before in [place]. He signed out of ED, flew to [place], and went to [facility]. MRI showed R MCA stroke.       #R MCA CVA with Left sided weakness  - Continue ASA and Lipitor  - F/u neuro, Dr. Mota, outpatient  - [facility] comprehensive rehab program, PT/OT/SLP 3 hours a day, 5 days a week.  - Precautions: falls, aspiration    Tobacco dependence  --Pt's daughter would like him to have nicotine patch  --d/w pt. -- agrees-- will start     #Intraparotid Nodules  - F/u with PCP outpatient    GI/Bowel  - senna qhs, miralax  - dulcolax supp PRN    /Bladder:   - Continent-- voiding well-- PVR=10  --d/c PVR monitoring.     Skin/Pressure Injury:   - At risk for pressure injury due to neurologic diagnosis and relative immobility.  - Skin assessment on admission: intact  - Turn every 2 hours while in bed, air mattress  - Soft heel protectors  - Skin barrier cream as needed  - Nursing to monitor skin Qshift    Diet/Dysphagia:  - Diet Consistency/Modifications: [facility] soft  - Aspiration Precautions  - SLP consult for swallow function evaluation and treatment    DVT ppx:  - Lovenox  - SCDs  ---------------  Outpatient Follow-up (Specialty/Name of physician):  Alfredo Mota  NEUROLOGY  [address] [place] [address] [phone]  Phone: [phone]  Fax: [phone]   Assessment/Plan:  SHAWNA LAURENT is a 60y with no significant PMHx presented to [facility] 1/29/21 for stroke symptoms that began the day before in [place]. He signed out of ED, flew to [place], and went to [facility]. MRI showed R MCA stroke.       #R MCA CVA with Left sided weakness  - Continue ASA and Lipitor  - F/u neuro, Dr. Mota, outpatient  - [facility] comprehensive rehab program, PT/OT/SLP 3 hours a day, 5 days a week.  - Precautions: falls, aspiration    Tobacco dependence  --Pt's daughter would like him to have nicotine patch  --d/w pt. -- agrees-- will start     #Intraparotid Nodules  - F/u with PCP outpatient    GI/Bowel  - senna qhs, miralax  - dulcolax supp PRN    /Bladder:   - Continent-- voiding well-- PVR=10  --d/c PVR monitoring.     Skin/Pressure Injury:   - At risk for pressure injury due to neurologic diagnosis and relative immobility.  - Skin assessment on admission: intact  - Turn every 2 hours while in bed, air mattress  - Soft heel protectors  - Skin barrier cream as needed  - Nursing to monitor skin Qshift    Diet/Dysphagia:  - Diet Consistency/Modifications: [facility] soft  - Aspiration Precautions  - SLP consult for swallow function evaluation and treatment    DVT ppx:  - Lovenox  - SCDs  ---------------  Outpatient Follow-up (Specialty/Name of physician):  Alfredo Mota  NEUROLOGY  [address] [place] [address] [phone]  Phone: [phone]  Fax: [phone]      Spoke with daughter on the phone, reviewed MRI and CT results, provided clarification. Provided medical and therapy updates. Discussed that patient will have a Team meeting next Tuesday and we will have a better understanding of what his goals for discharge will be, and his estimated length of stay. Ms. Zurita states that patient's wife is schedule for a breast mass resection on 2/9/21 and will likely be unable to provide physical assistance at home. The patient was his wife's primary caregiver. Ms. Zurita and her brother do not live with the patient, but are nearby and are able to check in and provide assistance. All further questions answered.

## 2021-02-04 NOTE — CONSULT NOTE ADULT - SUBJECTIVE AND OBJECTIVE BOX
Patient is a 60y old  Male who presents with a chief complaint of R MCA stroke 01.1 Left Body Involvement (Right Brain) (04 Feb 2021 10:16)    HPI:  59yo male with no pertinent PMH presented with stroke to [facility] 1/29/21. Per daughter, patient had symptoms [facility] He was seen in ER, had CT and covid test, then he was signed out and took a flight to [place]. Patient got off plane and presented to [facility] ED. Pt with left arm weakness and dysarthria, but denies headache, cp, sob, dizziness, NV. Patient was found to have MRI findings of acute and/or recent subacute R MCA stroke and a parotid lesion. Patient was started on ASA and statin. Patient was medically stable for discharge to [facility] 2/3/21.  (03 Feb 2021 14:19)    Patient seen and examined at bedside, stable, NAD. No acute overnight events. Denies headache, fever, chills, cp, sob, n/v, abd pain.    PAST MEDICAL & SURGICAL HISTORY: Denies  No pertinent past medical history  No significant past surgical history    SOCIAL HISTORY: Chronic tobacco use 1ppd > 20 years. Denies EtOH or illicit drug use. 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 Previously independent with ADLs, IADLs, and ambulation.    FAMILY HISTORY:    ALLERGIES:  No Known Allergies    MEDICATIONS  (STANDING):  AQUAPHOR (petrolatum Ointment) 1 Application(s) Topical daily  aspirin enteric coated 81 milliGRAM(s) Oral daily  atorvastatin 40 milliGRAM(s) Oral at bedtime  enoxaparin Injectable 40 milliGRAM(s) SubCutaneous daily  nicotine - 21 mG/24Hr(s) Patch 1 patch Transdermal daily  polyethylene glycol 3350 17 Gram(s) Oral daily  senna 2 Tablet(s) Oral at bedtime    MEDICATIONS  (PRN):  bisacodyl Suppository 10 milliGRAM(s) Rectal daily PRN Constipation  guaiFENesin   Syrup  (Sugar-Free) 200 milliGRAM(s) Oral every 6 hours PRN Cough    Review of Systems: Refer to HPI for pertinent positives and negatives. All other ROS reviewed and negative except as otherwise stated above.    Vital Signs Last 24 Hrs  T(F): 97.8 (04 Feb 2021 07:53), Max: 98.2 (03 Feb 2021 13:24)  HR: 62 (04 Feb 2021 07:53) (58 - 78)  BP: 125/82 (04 Feb 2021 07:53) (118/77 - 125/82)  RR: 16 (04 Feb 2021 07:53) (14 - 18)  SpO2: 98% (04 Feb 2021 07:53) (94% - 98%)  I&O's Summary    03 Feb 2021 07:01  -  04 Feb 2021 07:00  --------------------------------------------------------  IN: 0 mL / OUT: 300 mL / NET: -300 mL      PHYSICAL EXAM:  GENERAL: NAD, well-groomed, well-developed  HEAD:  Atraumatic, Normocephalic  EYES: EOMI, PERRL, conjunctiva and sclera clear  ENMT: Moist mucous membranes, Good dentition  NECK: Supple, No JVD  CHEST/LUNG: Clear to auscultation bilaterally, non-labored breathing, good air entry  HEART: RRR; S1/S2, No murmur  ABDOMEN: Soft, Nontender, Nondistended; Bowel sounds present  VASCULAR: Normal pulses, Normal capillary refill  EXTREMITIES: No cyanosis, No edema  LYMPH: No lymphadenopathy noted  SKIN: Warm, Intact  PSYCH: Normal mood and affect  NERVOUS SYSTEM:  A/O x3, Good concentration; CN 2-12 intact, No focal deficits, moves all extremities    LABS:                        14.8   10.98 )-----------( 230      ( 04 Feb 2021 05:45 )             43.6     02-04    142  |  107  |  14  ----------------------------<  90  4.1   |  27  |  0.97    Ca    9.0      04 Feb 2021 05:45    TPro  7.6  /  Alb  2.9  /  TBili  0.6  /  DBili  x   /  AST  32  /  ALT  46  /  AlkPhos  95  02-04    eGFR if Non African American: 85 mL/min/1.73M2 (02-04-21 @ 05:45)  eGFR if African American: 98 mL/min/1.73M2 (02-04-21 @ 05:45)                                RADIOLOGY & ADDITIONAL TESTS:    Care Discussed with Consultants/Other Providers: Patient is a 60y old  Male who presents with a chief complaint of R MCA stroke 01.1 Left Body Involvement (Right Brain) (04 Feb 2021 10:16)    HPI:  59yo male with no pertinent PMH presented with stroke to [facility] 1/29/21. Per daughter, patient had symptoms [facility]. He was seen in ER, had CT and covid test, then he was signed out and took a flight to [place]. Patient got off plane and presented to [facility] ED. Pt with left arm weakness and dysarthria, but denies headache, cp, sob, dizziness, NV. Patient was found to have MRI findings of acute and/or recent subacute R MCA stroke and a parotid lesion. Patient was started on ASA and statin. Patient was medically stable for discharge to [facility] 2/3/21.  (03 Feb 2021 14:19)    Patient seen and examined at bedside, stable, NAD. No acute overnight events. Denies headache, fever, chills, cp, sob, n/v, abd pain.    PAST MEDICAL & SURGICAL HISTORY: Denies  No pertinent past medical history  No significant past surgical history    SOCIAL HISTORY: Chronic tobacco use 1ppd > 20 years. Denies EtOH or illicit drug use. 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 Previously independent with ADLs, IADLs, and ambulation.    FAMILY HISTORY: father - CVA, T2DM    ALLERGIES:  No Known Allergies    MEDICATIONS  (STANDING):  AQUAPHOR (petrolatum Ointment) 1 Application(s) Topical daily  aspirin enteric coated 81 milliGRAM(s) Oral daily  atorvastatin 40 milliGRAM(s) Oral at bedtime  enoxaparin Injectable 40 milliGRAM(s) SubCutaneous daily  nicotine - 21 mG/24Hr(s) Patch 1 patch Transdermal daily  polyethylene glycol 3350 17 Gram(s) Oral daily  senna 2 Tablet(s) Oral at bedtime    MEDICATIONS  (PRN):  bisacodyl Suppository 10 milliGRAM(s) Rectal daily PRN Constipation  guaiFENesin   Syrup  (Sugar-Free) 200 milliGRAM(s) Oral every 6 hours PRN Cough    Review of Systems: Refer to HPI for pertinent positives and negatives. All other ROS reviewed and negative except as otherwise stated above.    Vital Signs Last 24 Hrs  T(F): 97.8 (04 Feb 2021 07:53), Max: 98.2 (03 Feb 2021 13:24)  HR: 62 (04 Feb 2021 07:53) (58 - 78)  BP: 125/82 (04 Feb 2021 07:53) (118/77 - 125/82)  RR: 16 (04 Feb 2021 07:53) (14 - 18)  SpO2: 98% (04 Feb 2021 07:53) (94% - 98%)  I&O's Summary    03 Feb 2021 07:01  -  04 Feb 2021 07:00  --------------------------------------------------------  IN: 0 mL / OUT: 300 mL / NET: -300 mL      PHYSICAL EXAM:  GENERAL: NAD, well-groomed, well-developed  HEAD:  Atraumatic, Normocephalic  EYES: EOMI, PERRL, conjunctiva and sclera clear  ENMT: Moist mucous membranes, Good dentition  NECK: Supple, No JVD  CHEST/LUNG: Clear to auscultation bilaterally, non-labored breathing, good air entry  HEART: RRR; S1/S2, No murmur  ABDOMEN: Soft, Nontender, Nondistended; Bowel sounds present  VASCULAR: Normal pulses, Normal capillary refill  EXTREMITIES: No cyanosis, No edema b/l, LUE, LLE strength 3-4/5, RUE, RLE strength 5/5   LYMPH: No lymphadenopathy noted  SKIN: Warm, Intact  PSYCH: Normal mood and affect  NERVOUS SYSTEM:  A/O x3, Good concentration; moves all extremities    LABS:                        14.8   10.98 )-----------( 230      ( 04 Feb 2021 05:45 )             43.6     02-04    142  |  107  |  14  ----------------------------<  90  4.1   |  27  |  0.97    Ca    9.0      04 Feb 2021 05:45    TPro  7.6  /  Alb  2.9  /  TBili  0.6  /  DBili  x   /  AST  32  /  ALT  46  /  AlkPhos  95  02-04    eGFR if Non African American: 85 mL/min/1.73M2 (02-04-21 @ 05:45)  eGFR if African American: 98 mL/min/1.73M2 (02-04-21 @ 05:45)    RADIOLOGY & ADDITIONAL TESTS: reviewed    Care Discussed with Consultants/Other Providers: yes - rehab

## 2021-02-04 NOTE — PROGRESS NOTE ADULT - ATTENDING COMMENTS
Pt. seen with resident.  Agree with documentation above as per resident with amendments made as appropriate. Patient medically stable. Making progress towards rehab goals.     started nicotine patch.

## 2021-02-05 PROCEDURE — 99232 SBSQ HOSP IP/OBS MODERATE 35: CPT

## 2021-02-05 PROCEDURE — 93880 EXTRACRANIAL BILAT STUDY: CPT | Mod: 26

## 2021-02-05 PROCEDURE — 99233 SBSQ HOSP IP/OBS HIGH 50: CPT

## 2021-02-05 RX ADMIN — ENOXAPARIN SODIUM 40 MILLIGRAM(S): 100 INJECTION SUBCUTANEOUS at 11:44

## 2021-02-05 RX ADMIN — POLYETHYLENE GLYCOL 3350 17 GRAM(S): 17 POWDER, FOR SOLUTION ORAL at 11:44

## 2021-02-05 RX ADMIN — ATORVASTATIN CALCIUM 40 MILLIGRAM(S): 80 TABLET, FILM COATED ORAL at 21:27

## 2021-02-05 RX ADMIN — SENNA PLUS 2 TABLET(S): 8.6 TABLET ORAL at 21:27

## 2021-02-05 RX ADMIN — Medication 1 PATCH: at 19:28

## 2021-02-05 RX ADMIN — Medication 81 MILLIGRAM(S): at 11:44

## 2021-02-05 RX ADMIN — Medication 1 PATCH: at 11:44

## 2021-02-05 RX ADMIN — Medication 1 APPLICATION(S): at 15:51

## 2021-02-05 NOTE — PROGRESS NOTE ADULT - SUBJECTIVE AND OBJECTIVE BOX
Patient is a 60y old  Male who presents with a chief complaint of R MCA stroke  01.1 Left Body Involvement (Right Brain) (04 Feb 2021 13:17)      Patient seen and examined at bedside. No acute overnight events. denies headache, fever, chills, cp, sob, n/v, abd pain      ALLERGIES:  No Known Allergies    MEDICATIONS  (STANDING):  AQUAPHOR (petrolatum Ointment) 1 Application(s) Topical daily  aspirin enteric coated 81 milliGRAM(s) Oral daily  atorvastatin 40 milliGRAM(s) Oral at bedtime  enoxaparin Injectable 40 milliGRAM(s) SubCutaneous daily  nicotine - 21 mG/24Hr(s) Patch 1 patch Transdermal daily  polyethylene glycol 3350 17 Gram(s) Oral daily  senna 2 Tablet(s) Oral at bedtime    MEDICATIONS  (PRN):  bisacodyl Suppository 10 milliGRAM(s) Rectal daily PRN Constipation  guaiFENesin   Syrup  (Sugar-Free) 200 milliGRAM(s) Oral every 6 hours PRN Cough    Vital Signs Last 24 Hrs  T(F): 98.4 (04 Feb 2021 21:04), Max: 98.4 (04 Feb 2021 21:04)  HR: 62 (04 Feb 2021 21:04) (62 - 62)  BP: 112/70 (04 Feb 2021 21:04) (112/70 - 125/82)  RR: 15 (04 Feb 2021 21:04) (15 - 16)  SpO2: 95% (04 Feb 2021 21:04) (95% - 98%)  I&O's Summary    04 Feb 2021 07:01  -  05 Feb 2021 07:00  --------------------------------------------------------  IN: 600 mL / OUT: 620 mL / NET: -20 mL      BMI (kg/m2): 32.2 (02-03-21 @ 17:50)    PHYSICAL EXAM:  General: NAD, A/O x 3  ENT: MMM, no scleral icterus  Neck: Supple, No JVD  Lungs: Clear to auscultation bilaterally, no wheezes, rales, rhonchi  Cardio: RRR, S1/S2, No murmurs  Abdomen: Soft, Nontender, Nondistended; Bowel sounds present  Extremities: No calf tenderness, No pitting edema b/l, +LUE, LLE weakness  Psych: mood, affect appropriate     LABS:                        14.8   10.98 )-----------( 230      ( 04 Feb 2021 05:45 )             43.6       02-04    142  |  107  |  14  ----------------------------<  90  4.1   |  27  |  0.97    Ca    9.0      04 Feb 2021 05:45    TPro  7.6  /  Alb  2.9  /  TBili  0.6  /  DBili  x   /  AST  32  /  ALT  46  /  AlkPhos  95  02-04     eGFR if Non African American: 85 mL/min/1.73M2 (02-04-21 @ 05:45)  eGFR if African American: 98 mL/min/1.73M2 (02-04-21 @ 05:45)    01-30 Chol 128 mg/dL LDL -- HDL 21 mg/dL Trig 106 mg/dL    RADIOLOGY & ADDITIONAL TESTS: reviewed    Care Discussed with Consultants/Other Providers: yes - rehab   Patient is a 60y old  Male who presents with a chief complaint of R MCA stroke  01.1 Left Body Involvement (Right Brain) (04 Feb 2021 13:17)      Patient seen and examined at bedside. No acute overnight events. denies headache, fever, chills, cp, sob, n/v, abd pain. wants to be OOB to chair more      ALLERGIES:  No Known Allergies    MEDICATIONS  (STANDING):  AQUAPHOR (petrolatum Ointment) 1 Application(s) Topical daily  aspirin enteric coated 81 milliGRAM(s) Oral daily  atorvastatin 40 milliGRAM(s) Oral at bedtime  enoxaparin Injectable 40 milliGRAM(s) SubCutaneous daily  nicotine - 21 mG/24Hr(s) Patch 1 patch Transdermal daily  polyethylene glycol 3350 17 Gram(s) Oral daily  senna 2 Tablet(s) Oral at bedtime    MEDICATIONS  (PRN):  bisacodyl Suppository 10 milliGRAM(s) Rectal daily PRN Constipation  guaiFENesin   Syrup  (Sugar-Free) 200 milliGRAM(s) Oral every 6 hours PRN Cough    Vital Signs Last 24 Hrs  T(C): 36.7 (05 Feb 2021 08:26), Max: 36.9 (04 Feb 2021 21:04)  T(F): 98.1 (05 Feb 2021 08:26), Max: 98.4 (04 Feb 2021 21:04)  HR: 73 (05 Feb 2021 08:26) (62 - 73)  BP: 124/74 (05 Feb 2021 08:26) (112/70 - 124/74)  BP(mean): --  RR: 18 (05 Feb 2021 08:26) (15 - 18)  SpO2: 99% (05 Feb 2021 08:26) (95% - 99%)  I&O's Summary    04 Feb 2021 07:01  -  05 Feb 2021 07:00  --------------------------------------------------------  IN: 600 mL / OUT: 620 mL / NET: -20 mL      BMI (kg/m2): 32.2 (02-03-21 @ 17:50)    PHYSICAL EXAM:  General: NAD, A/O x 3  ENT: MMM, no scleral icterus  Neck: Supple, No JVD  Lungs: Clear to auscultation bilaterally, no wheezes, rales, rhonchi  Cardio: RRR, S1/S2, No murmurs  Abdomen: Soft, Nontender, Nondistended; Bowel sounds present  Extremities: No calf tenderness, No pitting edema b/l, +LUE, LLE weakness  Psych: mood, affect appropriate     LABS:                        14.8   10.98 )-----------( 230      ( 04 Feb 2021 05:45 )             43.6       02-04    142  |  107  |  14  ----------------------------<  90  4.1   |  27  |  0.97    Ca    9.0      04 Feb 2021 05:45    TPro  7.6  /  Alb  2.9  /  TBili  0.6  /  DBili  x   /  AST  32  /  ALT  46  /  AlkPhos  95  02-04     eGFR if Non African American: 85 mL/min/1.73M2 (02-04-21 @ 05:45)  eGFR if African American: 98 mL/min/1.73M2 (02-04-21 @ 05:45)    01-30 Chol 128 mg/dL LDL -- HDL 21 mg/dL Trig 106 mg/dL    RADIOLOGY & ADDITIONAL TESTS: reviewed    Care Discussed with Consultants/Other Providers: yes - rehab

## 2021-02-05 NOTE — PROGRESS NOTE ADULT - ASSESSMENT
Assessment/Plan:  SHAWNA LAURENT is a 60y with no significant PMHx presented to [facility] 1/29/21 for stroke symptoms that began the day before in [place]. He signed out of ED, flew to [place], and went to [facility]. MRI showed R MCA stroke.       #R MCA CVA with Left sided weakness  - Continue ASA and Lipitor  - F/u neuro, Dr. Mota, outpatient  - Cont comprehensive rehab program, PT/OT/SLP 3 hours a day, 5 days a week.  - Precautions: falls, aspiration    Tobacco dependence  - continue nicotine patch     #Intraparotid Nodules  - F/u with PCP outpatient    GI/Bowel  - senna qhs, miralax  - dulcolax supp PRN    /Bladder:   - Continent-- voiding well-- PVR=10  --d/c PVR monitoring.     Skin/Pressure Injury:   - At risk for pressure injury due to neurologic diagnosis and relative immobility.  - Skin assessment on admission: intact  - Turn every 2 hours while in bed, air mattress  - Soft heel protectors  - Skin barrier cream as needed  - Nursing to monitor skin Qshift    Diet/Dysphagia:  - Diet Consistency/Modifications: [facility] soft  - Aspiration Precautions  - SLP consult for swallow function evaluation and treatment    DVT ppx:  - Lovenox  - SCDs  ---------------  Outpatient Follow-up (Specialty/Name of physician):  Alfredo Mota  NEUROLOGY  [address] [place] [address] [phone]  Phone: [phone]  Fax: [phone]

## 2021-02-05 NOTE — PROGRESS NOTE ADULT - ATTENDING COMMENTS
Pt. seen with resident.  Agree with documentation above as per resident with amendments made as appropriate. Patient medically stable. Making progress towards rehab goals.     ** Spoke with pt's daughter, Laura Zurita, to provide update on pt's status,  medical update, progress in therapy, ELOS and discharge needs/expectations.  Pt. will have supervision at home from family.  All questions answered.

## 2021-02-05 NOTE — PROGRESS NOTE ADULT - ASSESSMENT
59 y/o M with PMHx chronic tobacco use presented to [facility] 1/29/21 for stroke symptoms that began the day before in [place]. He signed out of ED, flew to [place], and went to [facility]. MRI showed R MCA stroke. Patient admitted [facility].     #R MCA CVA with Left sided weakness  -Continue comprehensive rehab program -PT/OT/SLP per rehab team  -Pain management, bowel regimen per rehab   -Continue ASA and Lipitor  -F/u neuro, Dr. Mota, outpatient  -Aspiration, fall, safety, seizure precautions  -SLP, nutrition     #Intraparotid Nodules  -F/u with PCP outpatient    #Tobacco use  -Smoking cessation counseling provided  -Nicotine patch    #DVT ppx - lovenox   59 y/o M with PMHx chronic tobacco use presented to [facility] 1/29/21 for stroke symptoms that began the day before in [place]. He signed out of ED, flew to [place], and went to [facility]. MRI showed R MCA stroke. Patient admitted [facility].     #R MCA CVA with Left sided weakness  -Continue comprehensive rehab program -PT/OT/SLP per rehab team  -Pain management, bowel regimen per rehab   -Continue ASA and Lipitor  -F/u neuro, Dr. Mota, outpatient  -Aspiration, fall, safety, seizure precautions  -SLP, nutrition     #Intraparotid Nodules  -F/u with PCP outpatient    #Tobacco use  -Nicotine patch    #DVT ppx - lovenox

## 2021-02-05 NOTE — PROGRESS NOTE ADULT - SUBJECTIVE AND OBJECTIVE BOX
Patient is a 60y old  Male who presents with a chief complaint of R MCA stroke  01.1 Left Body Involvement (Right Brain) (2021 07:30)      HPI:  61yo male with no pertinent PMH presented with stroke to [facility] 21. Per daughter, patient had symptoms starting 21 at 10am in the [facility]. He was seen in ER, had CT and covid test, then he was signed out and took a flight to [place]. Patient got off plane and presented to [facility] ED. Pt with left arm weakness and dysarthria, but denies headache, cp, sob, dizziness, NV. Patient was found to have MRI findings of acute and/or recent subacute R MCA stroke and a parotid lesion. Patient was started on ASA and statin.     Patient was medically stable for discharge to [place] for multidisciplinary acute rehab 2/3/21.     (2021 14:19)        SUBJECTIVE: Patient seen and examined. No acute overnight events, slept well. Denies pain or discomfort. Was seen ambulating with PT later. No other complaints.       REVIEW OF SYSTEMS  Constitutional: No fever, No fatigue  HEENT: No eye pain, No visual disturbances  Pulm: No cough,  No shortness of breath  Cardio: No chest pain  GI:  No abdominal pain, No nausea, No vomiting, No diarrhea, No constipation last    Neuro: No headaches, +memory loss, +loss of strength  MSK: No joint pain, No joint swelling, No muscle pain, No Neck or back pain  Psych:  No depression, No anxiety      VITALS  60y  Vital Signs Last 24 Hrs  T(C): 36.7 (2021 08:26), Max: 36.9 (2021 21:04)  T(F): 98.1 (2021 08:26), Max: 98.4 (2021 21:04)  HR: 73 (2021 08:26) (62 - 73)  BP: 124/74 (2021 08:26) (112/70 - 124/74)  BP(mean): --  RR: 18 (2021 08:26) (15 - 18)  SpO2: 99% (2021 08:26) (95% - 99%)  Daily     Daily Weight in k.9 (2021 00:00)        Gen - NAD, Comfortable  HEENT - NCAT, EOMI, MMM  Pulm - CTAB, No wheeze  Cardiovascular - RRR, S1S2, No m/r/g  Abdomen - Soft, NT/ND, +BS  Extremities - No C/C/E, No calf tenderness  Neuro-     Cognitive - AAOx4 to person, place, time and situation.     Communication - Fluent, +dysarthria         Cranial Nerves - CN 2-12 intact     Motor -                    LEFT    UE - ShAB 3/5, EF 3/5, EE 3/5, WE 2/5,  2/5                    RIGHT UE - ShAB 5/5, EF 5/5, EE 5/5, WE 5/5,  5/5                    LEFT    LE - HF 4/5, KE 5/5, DF 5/5, PF 5/5                    RIGHT LE - HF 5/5, KE 5/5, DF 5/5, PF 5/5        Reflexes - DTR Intact, No primitive reflex, BL positive Hoffmans     Coordination - L FTN dysmetria  Psychiatric - Mood stable, Affect WNL  Skin: Intact  Wounds: None Present         RECENT LABS:                        14.8   10.98 )-----------( 230      ( 2021 05:45 )             43.6     02-04    142  |  107  |  14  ----------------------------<  90  4.1   |  27  |  0.97    Ca    9.0      2021 05:45    TPro  7.6  /  Alb  2.9<L>  /  TBili  0.6  /  DBili  x   /  AST  32  /  ALT  46<H>  /  AlkPhos  95  02-04    LIVER FUNCTIONS - ( 2021 05:45 )  Alb: 2.9 g/dL / Pro: 7.6 g/dL / ALK PHOS: 95 U/L / ALT: 46 U/L / AST: 32 U/L / GGT: x                   MEDICATIONS:  MEDICATIONS  (STANDING):  AQUAPHOR (petrolatum Ointment) 1 Application(s) Topical daily  aspirin enteric coated 81 milliGRAM(s) Oral daily  atorvastatin 40 milliGRAM(s) Oral at bedtime  enoxaparin Injectable 40 milliGRAM(s) SubCutaneous daily  nicotine - 21 mG/24Hr(s) Patch 1 patch Transdermal daily  polyethylene glycol 3350 17 Gram(s) Oral daily  senna 2 Tablet(s) Oral at bedtime    MEDICATIONS  (PRN):  bisacodyl Suppository 10 milliGRAM(s) Rectal daily PRN Constipation  guaiFENesin   Syrup  (Sugar-Free) 200 milliGRAM(s) Oral every 6 hours PRN Cough     Patient is a 60y old  Male who presents with a chief complaint of R MCA stroke  01.1 Left Body Involvement (Right Brain) (2021 07:30)      HPI:  59yo male with no pertinent PMH presented with stroke to [facility] 21. Per daughter, patient had symptoms starting 21 at 10am in the [facility]. He was seen in ER, had CT and covid test, then he was signed out and took a flight to [place]. Patient got off plane and presented to [facility] ED. Pt with left arm weakness and dysarthria, but denies headache, cp, sob, dizziness, NV. Patient was found to have MRI findings of acute and/or recent subacute R MCA stroke and a parotid lesion. Patient was started on ASA and statin.     Patient was medically stable for discharge to [place] for multidisciplinary acute rehab 2/3/21.     (2021 14:19)        SUBJECTIVE: Patient seen and examined. No acute overnight events, slept well. Denies pain or discomfort. Was seen ambulating with PT later. No other complaints.       REVIEW OF SYSTEMS  Constitutional: No fever, No fatigue  HEENT: No eye pain, No visual disturbances  Pulm: No cough,  No shortness of breath  Cardio: No chest pain  GI:  No abdominal pain, No nausea, No vomiting, No diarrhea, No constipation last    Neuro: No headaches, +memory loss, +loss of strength  MSK: No joint pain, No joint swelling, No muscle pain, No Neck or back pain  Psych:  No depression, No anxiety      VITALS  60y  Vital Signs Last 24 Hrs  T(C): 36.7 (2021 08:26), Max: 36.9 (2021 21:04)  T(F): 98.1 (2021 08:26), Max: 98.4 (2021 21:04)  HR: 73 (2021 08:26) (62 - 73)  BP: 124/74 (2021 08:26) (112/70 - 124/74)  BP(mean): --  RR: 18 (2021 08:26) (15 - 18)  SpO2: 99% (2021 08:26) (95% - 99%)  Daily     Daily Weight in k.9 (2021 00:00)        Gen - NAD, Comfortable  HEENT - NCAT, EOMI, MMM  Pulm - CTAB, No wheeze  Cardiovascular - RRR, S1S2, No m/r/g  Abdomen - Soft, NT/ND, +BS  Extremities - No C/C/E, No calf tenderness  Neuro-     Cognitive - AAOx4 to person, place, time and situation.     Communication - Fluent, +dysarthria         Cranial Nerves - CN 2-12 intact     Motor -                    LEFT    UE - ShAB 3/5, EF 3/5, EE 3/5, WE 2/5,  2/5                    RIGHT UE - ShAB 5/5, EF 5/5, EE 5/5, WE 5/5,  5/5                    LEFT    LE - HF 4/5, KE 5/5, DF 5/5, PF 5/5                    RIGHT LE - HF 5/5, KE 5/5, DF 5/5, PF 5/5        Reflexes - DTR Intact, No primitive reflex, BL positive Hoffmans     Coordination - L FTN dysmetria  Psychiatric - Mood stable, Affect WNL          RECENT LABS:                        14.8   10.98 )-----------( 230      ( 2021 05:45 )             43.6     02-04    142  |  107  |  14  ----------------------------<  90  4.1   |  27  |  0.97    Ca    9.0      2021 05:45    TPro  7.6  /  Alb  2.9<L>  /  TBili  0.6  /  DBili  x   /  AST  32  /  ALT  46<H>  /  AlkPhos  95  02-04    LIVER FUNCTIONS - ( 2021 05:45 )  Alb: 2.9 g/dL / Pro: 7.6 g/dL / ALK PHOS: 95 U/L / ALT: 46 U/L / AST: 32 U/L / GGT: x                   MEDICATIONS:  MEDICATIONS  (STANDING):  AQUAPHOR (petrolatum Ointment) 1 Application(s) Topical daily  aspirin enteric coated 81 milliGRAM(s) Oral daily  atorvastatin 40 milliGRAM(s) Oral at bedtime  enoxaparin Injectable 40 milliGRAM(s) SubCutaneous daily  nicotine - 21 mG/24Hr(s) Patch 1 patch Transdermal daily  polyethylene glycol 3350 17 Gram(s) Oral daily  senna 2 Tablet(s) Oral at bedtime    MEDICATIONS  (PRN):  bisacodyl Suppository 10 milliGRAM(s) Rectal daily PRN Constipation  guaiFENesin   Syrup  (Sugar-Free) 200 milliGRAM(s) Oral every 6 hours PRN Cough

## 2021-02-06 PROCEDURE — 99232 SBSQ HOSP IP/OBS MODERATE 35: CPT

## 2021-02-06 RX ADMIN — ENOXAPARIN SODIUM 40 MILLIGRAM(S): 100 INJECTION SUBCUTANEOUS at 11:40

## 2021-02-06 RX ADMIN — Medication 1 PATCH: at 11:40

## 2021-02-06 RX ADMIN — Medication 1 PATCH: at 17:28

## 2021-02-06 RX ADMIN — Medication 1 PATCH: at 11:42

## 2021-02-06 RX ADMIN — SENNA PLUS 2 TABLET(S): 8.6 TABLET ORAL at 21:12

## 2021-02-06 RX ADMIN — Medication 1 APPLICATION(S): at 11:39

## 2021-02-06 RX ADMIN — POLYETHYLENE GLYCOL 3350 17 GRAM(S): 17 POWDER, FOR SOLUTION ORAL at 11:39

## 2021-02-06 RX ADMIN — ATORVASTATIN CALCIUM 40 MILLIGRAM(S): 80 TABLET, FILM COATED ORAL at 21:12

## 2021-02-06 RX ADMIN — Medication 1 PATCH: at 07:45

## 2021-02-06 RX ADMIN — Medication 81 MILLIGRAM(S): at 11:39

## 2021-02-06 NOTE — PROGRESS NOTE ADULT - SUBJECTIVE AND OBJECTIVE BOX
Cc: Gait dysfunction    HPI: Patient with no new medical issues today.  No issues overnight  Pain controlled, no chest pain, no N/V, no Fevers/Chills. No other new ROS  Has been tolerating rehabilitation program.    AQUAPHOR (petrolatum Ointment) 1 Application(s) Topical daily  aspirin enteric coated 81 milliGRAM(s) Oral daily  atorvastatin 40 milliGRAM(s) Oral at bedtime  bisacodyl Suppository 10 milliGRAM(s) Rectal daily PRN  enoxaparin Injectable 40 milliGRAM(s) SubCutaneous daily  guaiFENesin   Syrup  (Sugar-Free) 200 milliGRAM(s) Oral every 6 hours PRN  nicotine - 21 mG/24Hr(s) Patch 1 patch Transdermal daily  polyethylene glycol 3350 17 Gram(s) Oral daily  senna 2 Tablet(s) Oral at bedtime      T(C): 36.4 (02-06-21 @ 08:16), Max: 37 (02-05-21 @ 17:30)  HR: 56 (02-06-21 @ 08:16) (56 - 89)  BP: 123/72 (02-06-21 @ 08:16) (122/80 - 123/72)  RR: 14 (02-06-21 @ 08:16) (14 - 17)  SpO2: 95% (02-06-21 @ 08:16) (95% - 98%)    In NAD  HEENT- EOMI  Heart- RRR, S1S2  Lungs- CTA bl.  Abd- + BS, NT  Ext- No calf pain  Neuro- Exam unchanged          Imp: Patient with diagnosis of cerebral infarction admitted for comprehensive acute rehabilitation.    Plan:  - Continue therapies  - Will stop Zosyn on 2/7 as per sign out  - DVT prophylaxis  - Skin- Turn q2h, check skin daily  - Continue current medications; patient medically stable.   - Patient is stable to continue current rehabilitation program.

## 2021-02-06 NOTE — PROGRESS NOTE ADULT - ASSESSMENT
61 y/o M with PMHx chronic tobacco use presented to [facility] 1/29/21 for stroke symptoms that began the day before in [place]. He signed out of ED, flew to [place], and went to [facility]. MRI showed R MCA stroke. Patient admitted to [facility] for rehab.     #R MCA CVA with Left sided weakness  -Continue comprehensive rehab program -PT/OT/SLP per rehab team  -Pain management, bowel regimen per rehab   -Continue ASA and Lipitor  -F/u neuro, Dr. Mota, outpatient  -Aspiration, fall, safety, seizure precautions  -SLP, nutrition     #Intraparotid Nodules  -F/u with PCP outpatient    #Tobacco use  -Nicotine patch    #DVT ppx - lovenox

## 2021-02-06 NOTE — PROGRESS NOTE ADULT - SUBJECTIVE AND OBJECTIVE BOX
Patient is a 60y old  Male who presents with a chief complaint of R MCA stroke  01.1 Left Body Involvement (Right Brain) (04 Feb 2021 13:17)      Patient seen and examined at bedside. No acute overnight events. denies headache, fever, chills, cp, sob, n/v, abd pain. wants to be OOB to chair more      ALLERGIES:  No Known Allergies    MEDICATIONS  (STANDING):  AQUAPHOR (petrolatum Ointment) 1 Application(s) Topical daily  aspirin enteric coated 81 milliGRAM(s) Oral daily  atorvastatin 40 milliGRAM(s) Oral at bedtime  enoxaparin Injectable 40 milliGRAM(s) SubCutaneous daily  nicotine - 21 mG/24Hr(s) Patch 1 patch Transdermal daily  polyethylene glycol 3350 17 Gram(s) Oral daily  senna 2 Tablet(s) Oral at bedtime    MEDICATIONS  (PRN):  bisacodyl Suppository 10 milliGRAM(s) Rectal daily PRN Constipation  guaiFENesin   Syrup  (Sugar-Free) 200 milliGRAM(s) Oral every 6 hours PRN Cough    Vital Signs Last 24 Hrs  T(C): 36.8 (05 Feb 2021 21:31), Max: 37 (05 Feb 2021 17:30)  T(F): 98.3 (05 Feb 2021 21:31), Max: 98.6 (05 Feb 2021 17:30)  HR: 89 (05 Feb 2021 21:31) (72 - 89)  BP: 123/72 (05 Feb 2021 21:31) (122/80 - 124/74)  BP(mean): --  RR: 14 (05 Feb 2021 21:31) (14 - 18)  SpO2: 95% (05 Feb 2021 21:31) (95% - 99%)    I&O's Summary    04 Feb 2021 07:01  -  05 Feb 2021 07:00  --------------------------------------------------------  IN: 600 mL / OUT: 620 mL / NET: -20 mL      BMI (kg/m2): 32.2 (02-03-21 @ 17:50)    PHYSICAL EXAM:  General: NAD, A/O x 3  ENT: MMM, no scleral icterus  Neck: Supple, No JVD  Lungs: Clear to auscultation bilaterally, no wheezes, rales, rhonchi  Cardio: RRR, S1/S2, No murmurs  Abdomen: Soft, Nontender, Nondistended; Bowel sounds present  Extremities: No calf tenderness, No pitting edema b/l, +LUE, LLE weakness  Psych: mood, affect appropriate     LABS:                        14.8   10.98 )-----------( 230      ( 04 Feb 2021 05:45 )             43.6       02-04    142  |  107  |  14  ----------------------------<  90  4.1   |  27  |  0.97    Ca    9.0      04 Feb 2021 05:45    TPro  7.6  /  Alb  2.9  /  TBili  0.6  /  DBili  x   /  AST  32  /  ALT  46  /  AlkPhos  95  02-04     eGFR if Non African American: 85 mL/min/1.73M2 (02-04-21 @ 05:45)  eGFR if African American: 98 mL/min/1.73M2 (02-04-21 @ 05:45)    01-30 Chol 128 mg/dL LDL -- HDL 21 mg/dL Trig 106 mg/dL    RADIOLOGY & ADDITIONAL TESTS: reviewed    Care Discussed with Consultants/Other Providers: yes - rehab Patient is a 60y old  Male who presents with a chief complaint of R MCA stroke  01.1 Left Body Involvement (Right Brain) (04 Feb 2021 13:17)      Patient seen and examined at bedside. No acute overnight events. denies headache, fever, chills, cp, sob, n/v, abd pain.      ALLERGIES:  No Known Allergies    MEDICATIONS  (STANDING):  AQUAPHOR (petrolatum Ointment) 1 Application(s) Topical daily  aspirin enteric coated 81 milliGRAM(s) Oral daily  atorvastatin 40 milliGRAM(s) Oral at bedtime  enoxaparin Injectable 40 milliGRAM(s) SubCutaneous daily  nicotine - 21 mG/24Hr(s) Patch 1 patch Transdermal daily  polyethylene glycol 3350 17 Gram(s) Oral daily  senna 2 Tablet(s) Oral at bedtime    MEDICATIONS  (PRN):  bisacodyl Suppository 10 milliGRAM(s) Rectal daily PRN Constipation  guaiFENesin   Syrup  (Sugar-Free) 200 milliGRAM(s) Oral every 6 hours PRN Cough    Vital Signs Last 24 Hrs  T(C): 36.4 (06 Feb 2021 08:16), Max: 37 (05 Feb 2021 17:30)  T(F): 97.6 (06 Feb 2021 08:16), Max: 98.6 (05 Feb 2021 17:30)  HR: 56 (06 Feb 2021 08:16) (56 - 89)  BP: 123/72 (06 Feb 2021 08:16) (122/80 - 123/72)  BP(mean): --  RR: 14 (06 Feb 2021 08:16) (14 - 17)  SpO2: 95% (06 Feb 2021 08:16) (95% - 98%)  I&O's Summary    04 Feb 2021 07:01  -  05 Feb 2021 07:00  --------------------------------------------------------  IN: 600 mL / OUT: 620 mL / NET: -20 mL      BMI (kg/m2): 32.2 (02-03-21 @ 17:50)    PHYSICAL EXAM:  General: NAD, A/O x 3  ENT: MMM, no scleral icterus  Neck: Supple, No JVD  Lungs: Clear to auscultation bilaterally, no wheezes, rales, rhonchi  Cardio: RRR, S1/S2, No murmurs  Abdomen: Soft, Nontender, Nondistended; Bowel sounds present  Extremities: No calf tenderness, No pitting edema b/l, +LUE, LLE weakness  Psych: mood, affect appropriate     LABS:                        14.8   10.98 )-----------( 230      ( 04 Feb 2021 05:45 )             43.6       02-04    142  |  107  |  14  ----------------------------<  90  4.1   |  27  |  0.97    Ca    9.0      04 Feb 2021 05:45    TPro  7.6  /  Alb  2.9  /  TBili  0.6  /  DBili  x   /  AST  32  /  ALT  46  /  AlkPhos  95  02-04     eGFR if Non African American: 85 mL/min/1.73M2 (02-04-21 @ 05:45)  eGFR if African American: 98 mL/min/1.73M2 (02-04-21 @ 05:45)    01-30 Chol 128 mg/dL LDL -- HDL 21 mg/dL Trig 106 mg/dL    RADIOLOGY & ADDITIONAL TESTS: reviewed    Care Discussed with Consultants/Other Providers: yes - rehab

## 2021-02-06 NOTE — DIETITIAN INITIAL EVALUATION ADULT. - OTHER INFO
H&P: 61 y/o M with PMHx chronic tobacco use presented to [facility] 1/29/21 for stroke symptoms that began the day before in [place]. He signed out of ED, flew to [place], and went to [facility]. MRI showed R MCA stroke.   Pt reports good appetite. PO intake %. Pt feed self, no chewing/swallowing difficulties w/ soft diet. NKFA. Denies N/V/C/D. Last BM: 2/04. Current wt 204.8 Lbs. No edema. Skin WDL. Noted low levels of HDL (21 on 01/30/21). Recommend pt to increase in his diet healthy fat foods such as fish, avocados, nuts.

## 2021-02-06 NOTE — DIETITIAN INITIAL EVALUATION ADULT. - PERTINENT LABORATORY DATA
21: WBC 10.98 H, H.8 , Hct: 43.0, Na:  142, K: 4.1, BUN:  14, Creat:  0.97, Glucose:  90,   HgA1C 5.2.

## 2021-02-07 PROCEDURE — 99232 SBSQ HOSP IP/OBS MODERATE 35: CPT

## 2021-02-07 RX ADMIN — Medication 1 APPLICATION(S): at 11:56

## 2021-02-07 RX ADMIN — POLYETHYLENE GLYCOL 3350 17 GRAM(S): 17 POWDER, FOR SOLUTION ORAL at 11:57

## 2021-02-07 RX ADMIN — SENNA PLUS 2 TABLET(S): 8.6 TABLET ORAL at 20:39

## 2021-02-07 RX ADMIN — ENOXAPARIN SODIUM 40 MILLIGRAM(S): 100 INJECTION SUBCUTANEOUS at 11:55

## 2021-02-07 RX ADMIN — Medication 1 PATCH: at 11:59

## 2021-02-07 RX ADMIN — Medication 1 PATCH: at 20:40

## 2021-02-07 RX ADMIN — Medication 1 PATCH: at 11:55

## 2021-02-07 RX ADMIN — ATORVASTATIN CALCIUM 40 MILLIGRAM(S): 80 TABLET, FILM COATED ORAL at 20:39

## 2021-02-07 RX ADMIN — Medication 81 MILLIGRAM(S): at 11:55

## 2021-02-07 RX ADMIN — Medication 1 PATCH: at 07:52

## 2021-02-07 NOTE — PROGRESS NOTE ADULT - SUBJECTIVE AND OBJECTIVE BOX
Patient is a 60y old  Male who presents with a chief complaint of R MCA stroke  01.1 Left Body Involvement (Right Brain) (04 Feb 2021 13:17)      Patient seen and examined at bedside. No acute overnight events. denies headache, fever, chills, cp, sob, n/v, abd pain.      ALLERGIES:  No Known Allergies    MEDICATIONS  (STANDING):  AQUAPHOR (petrolatum Ointment) 1 Application(s) Topical daily  aspirin enteric coated 81 milliGRAM(s) Oral daily  atorvastatin 40 milliGRAM(s) Oral at bedtime  enoxaparin Injectable 40 milliGRAM(s) SubCutaneous daily  nicotine - 21 mG/24Hr(s) Patch 1 patch Transdermal daily  polyethylene glycol 3350 17 Gram(s) Oral daily  senna 2 Tablet(s) Oral at bedtime    MEDICATIONS  (PRN):  bisacodyl Suppository 10 milliGRAM(s) Rectal daily PRN Constipation  guaiFENesin   Syrup  (Sugar-Free) 200 milliGRAM(s) Oral every 6 hours PRN Cough    Vital Signs Last 24 Hrs  T(C): 36.4 (06 Feb 2021 08:16), Max: 37 (05 Feb 2021 17:30)  T(F): 97.6 (06 Feb 2021 08:16), Max: 98.6 (05 Feb 2021 17:30)  HR: 56 (06 Feb 2021 08:16) (56 - 89)  BP: 123/72 (06 Feb 2021 08:16) (122/80 - 123/72)  BP(mean): --  RR: 14 (06 Feb 2021 08:16) (14 - 17)  SpO2: 95% (06 Feb 2021 08:16) (95% - 98%)  I&O's Summary    04 Feb 2021 07:01  -  05 Feb 2021 07:00  --------------------------------------------------------  IN: 600 mL / OUT: 620 mL / NET: -20 mL      BMI (kg/m2): 32.2 (02-03-21 @ 17:50)    PHYSICAL EXAM:  General: NAD, A/O x 3  ENT: MMM, no scleral icterus  Neck: Supple, No JVD  Lungs: Clear to auscultation bilaterally, no wheezes, rales, rhonchi  Cardio: RRR, S1/S2, No murmurs  Abdomen: Soft, Nontender, Nondistended; Bowel sounds present  Extremities: No calf tenderness, No pitting edema b/l, +LUE, LLE weakness  Psych: mood, affect appropriate     LABS:                        14.8   10.98 )-----------( 230      ( 04 Feb 2021 05:45 )             43.6       02-04    142  |  107  |  14  ----------------------------<  90  4.1   |  27  |  0.97    Ca    9.0      04 Feb 2021 05:45    TPro  7.6  /  Alb  2.9  /  TBili  0.6  /  DBili  x   /  AST  32  /  ALT  46  /  AlkPhos  95  02-04     eGFR if Non African American: 85 mL/min/1.73M2 (02-04-21 @ 05:45)  eGFR if African American: 98 mL/min/1.73M2 (02-04-21 @ 05:45)    01-30 Chol 128 mg/dL LDL -- HDL 21 mg/dL Trig 106 mg/dL    RADIOLOGY & ADDITIONAL TESTS: reviewed    Care Discussed with Consultants/Other Providers: yes - rehab Patient is a 60y old  Male who presents with a chief complaint of R MCA stroke  01.1 Left Body Involvement (Right Brain) (04 Feb 2021 13:17)      Patient seen and examined at bedside. No acute overnight events. denies headache, fever, chills, cp, sob, n/v, abd pain, or constipation.      ALLERGIES:  No Known Allergies    MEDICATIONS  (STANDING):  AQUAPHOR (petrolatum Ointment) 1 Application(s) Topical daily  aspirin enteric coated 81 milliGRAM(s) Oral daily  atorvastatin 40 milliGRAM(s) Oral at bedtime  enoxaparin Injectable 40 milliGRAM(s) SubCutaneous daily  nicotine - 21 mG/24Hr(s) Patch 1 patch Transdermal daily  polyethylene glycol 3350 17 Gram(s) Oral daily  senna 2 Tablet(s) Oral at bedtime    MEDICATIONS  (PRN):  bisacodyl Suppository 10 milliGRAM(s) Rectal daily PRN Constipation  guaiFENesin   Syrup  (Sugar-Free) 200 milliGRAM(s) Oral every 6 hours PRN Cough    Vital Signs Last 24 Hrs  T(C): 36.4 (07 Feb 2021 07:51), Max: 36.9 (06 Feb 2021 19:37)  T(F): 97.6 (07 Feb 2021 07:51), Max: 98.5 (06 Feb 2021 19:37)  HR: 60 (07 Feb 2021 07:51) (60 - 94)  BP: 114/69 (07 Feb 2021 07:51) (114/69 - 120/85)  BP(mean): --  RR: 14 (07 Feb 2021 07:51) (14 - 14)  SpO2: 94% (07 Feb 2021 07:51) (94% - 96%)  I&O's Summary    04 Feb 2021 07:01  -  05 Feb 2021 07:00  --------------------------------------------------------  IN: 600 mL / OUT: 620 mL / NET: -20 mL      BMI (kg/m2): 32.2 (02-03-21 @ 17:50)    PHYSICAL EXAM:  General: NAD, A/O x 3  ENT: MMM, no scleral icterus  Neck: Supple, No JVD  Lungs: Clear to auscultation bilaterally, no wheezes, rales, rhonchi  Cardio: RRR, S1/S2, No murmurs  Abdomen: Soft, Nontender, Nondistended; Bowel sounds present  Extremities: No calf tenderness, No pitting edema b/l, +LUE, LLE weakness  Psych: mood, affect appropriate     LABS:                        14.8   10.98 )-----------( 230      ( 04 Feb 2021 05:45 )             43.6       02-04    142  |  107  |  14  ----------------------------<  90  4.1   |  27  |  0.97    Ca    9.0      04 Feb 2021 05:45    TPro  7.6  /  Alb  2.9  /  TBili  0.6  /  DBili  x   /  AST  32  /  ALT  46  /  AlkPhos  95  02-04     eGFR if Non African American: 85 mL/min/1.73M2 (02-04-21 @ 05:45)  eGFR if African American: 98 mL/min/1.73M2 (02-04-21 @ 05:45)    01-30 Chol 128 mg/dL LDL -- HDL 21 mg/dL Trig 106 mg/dL    RADIOLOGY & ADDITIONAL TESTS: reviewed    Care Discussed with Consultants/Other Providers: yes - rehab

## 2021-02-07 NOTE — PROGRESS NOTE ADULT - SUBJECTIVE AND OBJECTIVE BOX
Cc: Gait dysfunction    HPI: Patient with no new medical issues today.  No acute events overnight.  Pain controlled, no chest pain, no N/V, no Fevers/Chills. No other new ROS  Has been tolerating rehabilitation program.    AQUAPHOR (petrolatum Ointment) 1 Application(s) Topical daily  aspirin enteric coated 81 milliGRAM(s) Oral daily  atorvastatin 40 milliGRAM(s) Oral at bedtime  bisacodyl Suppository 10 milliGRAM(s) Rectal daily PRN  enoxaparin Injectable 40 milliGRAM(s) SubCutaneous daily  guaiFENesin   Syrup  (Sugar-Free) 200 milliGRAM(s) Oral every 6 hours PRN  nicotine - 21 mG/24Hr(s) Patch 1 patch Transdermal daily  polyethylene glycol 3350 17 Gram(s) Oral daily  senna 2 Tablet(s) Oral at bedtime      T(C): 36.4 (02-06-21 @ 08:16), Max: 37 (02-05-21 @ 17:30)  HR: 56 (02-06-21 @ 08:16) (56 - 89)  BP: 123/72 (02-06-21 @ 08:16) (122/80 - 123/72)  RR: 14 (02-06-21 @ 08:16) (14 - 17)  SpO2: 95% (02-06-21 @ 08:16) (95% - 98%)    In NAD  HEENT- EOMI  Heart- RRR, S1S2  Lungs- CTA bl.  Abd- + BS, NT, ND  Ext- No calf pain  Neuro- Exam unchanged          Imp: Patient with diagnosis of cerebral infarction admitted for comprehensive acute rehabilitation.    Plan:  - Continue therapies  - DVT prophylaxis  - Skin- Turn q2h, check skin daily  - Continue current medications; patient medically stable.   - Patient is stable to continue current rehabilitation program.

## 2021-02-07 NOTE — PROGRESS NOTE ADULT - ASSESSMENT
61 y/o M with PMHx chronic tobacco use presented to [facility] 1/29/21 for stroke symptoms that began the day before in [place]. He signed out of ED, flew to [place], and went to [facility]. MRI showed R MCA stroke. Patient admitted to [facility] for [facility]hab.     #R MCA CVA with Left sided weakness  -Continue comprehensive rehab program -PT/OT/SLP per rehab team  -Pain management, bowel regimen per rehab   -Continue ASA and Lipitor  -F/u neuro, Dr. Mota, outpatient  -Aspiration, fall, safety, seizure precautions  -SLP, nutrition     #Intraparotid Nodules  -F/u with PCP outpatient    #Tobacco use  -Nicotine patch    #DVT ppx - lovenox

## 2021-02-08 DIAGNOSIS — R47.1 DYSARTHRIA AND ANARTHRIA: ICD-10-CM

## 2021-02-08 DIAGNOSIS — R29.810 FACIAL WEAKNESS: ICD-10-CM

## 2021-02-08 DIAGNOSIS — R29.707 NIHSS SCORE 7: ICD-10-CM

## 2021-02-08 DIAGNOSIS — I63.89 OTHER CEREBRAL INFARCTION: ICD-10-CM

## 2021-02-08 DIAGNOSIS — I63.511 CEREBRAL INFARCTION DUE TO UNSPECIFIED OCCLUSION OR STENOSIS OF RIGHT MIDDLE CEREBRAL ARTERY: ICD-10-CM

## 2021-02-08 DIAGNOSIS — G81.94 HEMIPLEGIA, UNSPECIFIED AFFECTING LEFT NONDOMINANT SIDE: ICD-10-CM

## 2021-02-08 DIAGNOSIS — K11.8 OTHER DISEASES OF SALIVARY GLANDS: ICD-10-CM

## 2021-02-08 LAB
ALBUMIN SERPL ELPH-MCNC: 3.1 G/DL — LOW (ref 3.3–5)
ALP SERPL-CCNC: 97 U/L — SIGNIFICANT CHANGE UP (ref 40–120)
ALT FLD-CCNC: 37 U/L — SIGNIFICANT CHANGE UP (ref 10–45)
ANION GAP SERPL CALC-SCNC: 13 MMOL/L — SIGNIFICANT CHANGE UP (ref 5–17)
AST SERPL-CCNC: 25 U/L — SIGNIFICANT CHANGE UP (ref 10–40)
BILIRUB SERPL-MCNC: 0.6 MG/DL — SIGNIFICANT CHANGE UP (ref 0.2–1.2)
BUN SERPL-MCNC: 18 MG/DL — SIGNIFICANT CHANGE UP (ref 7–23)
CALCIUM SERPL-MCNC: 9.2 MG/DL — SIGNIFICANT CHANGE UP (ref 8.4–10.5)
CHLORIDE SERPL-SCNC: 105 MMOL/L — SIGNIFICANT CHANGE UP (ref 96–108)
CO2 SERPL-SCNC: 24 MMOL/L — SIGNIFICANT CHANGE UP (ref 22–31)
CREAT SERPL-MCNC: 0.89 MG/DL — SIGNIFICANT CHANGE UP (ref 0.5–1.3)
GLUCOSE SERPL-MCNC: 90 MG/DL — SIGNIFICANT CHANGE UP (ref 70–99)
HCT VFR BLD CALC: 46.6 % — SIGNIFICANT CHANGE UP (ref 39–50)
HGB BLD-MCNC: 15.6 G/DL — SIGNIFICANT CHANGE UP (ref 13–17)
MCHC RBC-ENTMCNC: 31.9 PG — SIGNIFICANT CHANGE UP (ref 27–34)
MCHC RBC-ENTMCNC: 33.5 GM/DL — SIGNIFICANT CHANGE UP (ref 32–36)
MCV RBC AUTO: 95.3 FL — SIGNIFICANT CHANGE UP (ref 80–100)
NRBC # BLD: 0 /100 WBCS — SIGNIFICANT CHANGE UP (ref 0–0)
PLATELET # BLD AUTO: 224 K/UL — SIGNIFICANT CHANGE UP (ref 150–400)
POTASSIUM SERPL-MCNC: 3.7 MMOL/L — SIGNIFICANT CHANGE UP (ref 3.5–5.3)
POTASSIUM SERPL-SCNC: 3.7 MMOL/L — SIGNIFICANT CHANGE UP (ref 3.5–5.3)
PROT SERPL-MCNC: 7.9 G/DL — SIGNIFICANT CHANGE UP (ref 6–8.3)
RBC # BLD: 4.89 M/UL — SIGNIFICANT CHANGE UP (ref 4.2–5.8)
RBC # FLD: 13.5 % — SIGNIFICANT CHANGE UP (ref 10.3–14.5)
SODIUM SERPL-SCNC: 142 MMOL/L — SIGNIFICANT CHANGE UP (ref 135–145)
WBC # BLD: 10.61 K/UL — HIGH (ref 3.8–10.5)
WBC # FLD AUTO: 10.61 K/UL — HIGH (ref 3.8–10.5)

## 2021-02-08 PROCEDURE — 99232 SBSQ HOSP IP/OBS MODERATE 35: CPT | Mod: GC

## 2021-02-08 RX ADMIN — Medication 1 PATCH: at 12:39

## 2021-02-08 RX ADMIN — Medication 1 PATCH: at 12:44

## 2021-02-08 RX ADMIN — SENNA PLUS 2 TABLET(S): 8.6 TABLET ORAL at 20:25

## 2021-02-08 RX ADMIN — POLYETHYLENE GLYCOL 3350 17 GRAM(S): 17 POWDER, FOR SOLUTION ORAL at 12:39

## 2021-02-08 RX ADMIN — Medication 1 APPLICATION(S): at 12:40

## 2021-02-08 RX ADMIN — ATORVASTATIN CALCIUM 40 MILLIGRAM(S): 80 TABLET, FILM COATED ORAL at 20:25

## 2021-02-08 RX ADMIN — ENOXAPARIN SODIUM 40 MILLIGRAM(S): 100 INJECTION SUBCUTANEOUS at 12:39

## 2021-02-08 RX ADMIN — Medication 81 MILLIGRAM(S): at 12:39

## 2021-02-08 RX ADMIN — Medication 1 PATCH: at 20:46

## 2021-02-08 RX ADMIN — Medication 1 PATCH: at 07:36

## 2021-02-08 NOTE — PROGRESS NOTE ADULT - ASSESSMENT
Assessment/Plan:  SHAWNA LAURENT is a 60y with no significant PMHx presented to [facility] 1/29/21 for stroke symptoms that began the day before in [place]. He signed out of ED, flew to [place], and went to [facility]. MRI showed R MCA stroke.       #R MCA CVA with Left sided weakness  - Continue ASA and Lipitor  - F/u neuro, Dr. Mota, outpatient  - [facility] comprehensive rehab program, PT/OT/SLP 3 hours a day, 5 days a week.  - Precautions: falls, aspiration    #Left Carotid Artery stenosis  - BL Carotid Doppler US: 50-69% L ICA stenosis  - vascular surgery consult 2/8    #Tobacco dependence  - continue nicotine patch     #Intraparotid Nodules  - F/u with PCP outpatient    GI/Bowel  - senna qhs, miralax  - dulcolax supp PRN    /Bladder:   - Continent-- voiding well    Skin/Pressure Injury:   - At risk for pressure injury due to neurologic diagnosis and relative immobility.  - Skin assessment on admission: intact  - air mattress  - Soft heel protectors  - Skin barrier cream as needed  - Nursing to monitor skin Qshift    Diet/Dysphagia:  - Diet Consistency/Modifications: [facility] soft  - Aspiration Precautions  - SLP consult for swallow function evaluation and treatment    DVT ppx:  - Lovenox  - SCDs  ---------------  Outpatient Follow-up (Specialty/Name of physician):  Alfredo Mota  NEUROLOGY  [address] [place] [address] [id number]  Phone: [phone]  Fax: [phone]     Assessment/Plan:  SHAWNA LAURENT is a 60y with no significant PMHx presented to [facility] 1/29/21 for stroke symptoms that began the day before in [place]. He signed out of ED, flew to [place], and went to [facility]. MRI showed R MCA stroke.       #R MCA CVA with Left sided weakness  - Continue ASA and Lipitor  - F/u neuro, Dr. Mota, outpatient  - [facility] comprehensive rehab program, PT/OT/SLP 3 hours a day, 5 days a week.  - Precautions: falls, aspiration    #Left Carotid Artery stenosis  - BL Carotid Doppler US: 50-69% L ICA stenosis  - vascular surgery consult 2/8    #Tobacco dependence  - continue nicotine patch     #Intraparotid Nodules  - F/u with PCP outpatient    GI/Bowel  - senna qhs, miralax  - dulcolax supp PRN    /Bladder:   - Continent-- voiding well    Skin/Pressure Injury:   - At risk for pressure injury due to neurologic diagnosis and relative immobility.  - Skin assessment on admission: intact  - air mattress  - Soft heel protectors  - Skin barrier cream as needed  - Nursing to monitor skin Qshift    Diet/Dysphagia:  - Diet Consistency/Modifications: mech soft, DASH/TLC  - Aspiration Precautions  - SLP consult for swallow function evaluation and treatment    DVT ppx:  - Lovenox  - SCDs  ---------------  Outpatient Follow-up (Specialty/Name of physician):  Alfredo Mota  NEUROLOGY  [address] [place] [address] [id number]  Phone: [phone]  Fax: [phone]  ---  Family updated 2/8: Spoke with daughter Laura ([phone]). Pt's wife is having surgery 2/9. Updated her on carotid US results.

## 2021-02-08 NOTE — PROGRESS NOTE ADULT - SUBJECTIVE AND OBJECTIVE BOX
Patient is a 60y old  Male who presents with a chief complaint of R MCA stroke  01.1 Left Body Involvement (Right Brain) (07 Feb 2021 12:00)      HPI:  59yo male with no pertinent PMH presented with stroke to [facility] 1/29/21. Per daughter, patient had symptoms starting 1/28/21 at 10am in the [facility]. He was seen in ER, had CT and covid test, then he was signed out and took a flight to [place]. Patient got off plane and presented to [facility] ED. Pt with left arm weakness and dysarthria, but denies headache, cp, sob, dizziness, NV. Patient was found to have MRI findings of acute and/or recent subacute R MCA stroke and a parotid lesion. Patient was started on ASA and statin.     Patient was medically stable for discharge to [facility] for multidisciplinary acute rehab 2/3/21.     (03 Feb 2021 14:19)        SUBJECTIVE: Patient seen and examined at bedside while eating breakfast. Reports good appetite. No acute overnight events, slept well. Participating in therapies.  No other complaints.       REVIEW OF SYSTEMS  Constitutional: No fever, No fatigue  HEENT: No eye pain, No visual disturbances  Pulm: No cough,  No shortness of breath  Cardio: No chest pain  GI:  No abdominal pain, No nausea, No vomiting, No diarrhea, No constipation last  2/7  Neuro: No headaches, +memory loss, +loss of strength  MSK: No joint pain, No joint swelling, No muscle pain, No Neck or back pain  Psych:  No depression, No anxiety    VITALS  60y  Vital Signs Last 24 Hrs  T(C): 36.7 (08 Feb 2021 07:33), Max: 36.8 (07 Feb 2021 20:01)  T(F): 98.1 (08 Feb 2021 07:33), Max: 98.2 (07 Feb 2021 20:01)  HR: 55 (08 Feb 2021 07:33) (55 - 60)  BP: 132/75 (08 Feb 2021 07:33) (123/76 - 132/75)  BP(mean): --  RR: 14 (08 Feb 2021 07:33) (14 - 14)  SpO2: 95% (08 Feb 2021 07:33) (94% - 95%)  Daily         Gen - NAD, Comfortable  HEENT - NCAT, EOMI, MMM  Pulm - CTAB, No wheeze  Cardiovascular - RRR, S1S2, No m/r/g  Abdomen - Soft, NT/ND, +BS  Extremities - No C/C/E, No calf tenderness  Neuro-     Cognitive - AAOx4 to person, place, time and situation.     Communication - Fluent, +dysarthria         Cranial Nerves - CN 2-12 intact     Motor -                    LEFT    UE - ShAB 3/5, EF 3/5, EE 3/5, WE 2/5,  2/5                    RIGHT UE - ShAB 5/5, EF 5/5, EE 5/5, WE 5/5,  5/5                    LEFT    LE - HF 4/5, KE 5/5, DF 5/5, PF 5/5                    RIGHT LE - HF 5/5, KE 5/5, DF 5/5, PF 5/5        Reflexes - DTR Intact, No primitive reflex, BL positive Hoffmans     Coordination - L FTN dysmetria  Psychiatric - Mood stable, Affect WNL        RECENT LABS:                        15.6   10.61 )-----------( 224      ( 08 Feb 2021 07:00 )             46.6     02-08    142  |  105  |  18  ----------------------------<  90  3.7   |  24  |  0.89    Ca    9.2      08 Feb 2021 07:00    TPro  7.9  /  Alb  3.1<L>  /  TBili  0.6  /  DBili  x   /  AST  25  /  ALT  37  /  AlkPhos  97  02-08    LIVER FUNCTIONS - ( 08 Feb 2021 07:00 )  Alb: 3.1 g/dL / Pro: 7.9 g/dL / ALK PHOS: 97 U/L / ALT: 37 U/L / AST: 25 U/L / GGT: x                     MEDICATIONS:  MEDICATIONS  (STANDING):  AQUAPHOR (petrolatum Ointment) 1 Application(s) Topical daily  aspirin enteric coated 81 milliGRAM(s) Oral daily  atorvastatin 40 milliGRAM(s) Oral at bedtime  enoxaparin Injectable 40 milliGRAM(s) SubCutaneous daily  nicotine - 21 mG/24Hr(s) Patch 1 patch Transdermal daily  polyethylene glycol 3350 17 Gram(s) Oral daily  senna 2 Tablet(s) Oral at bedtime    MEDICATIONS  (PRN):  bisacodyl Suppository 10 milliGRAM(s) Rectal daily PRN Constipation  guaiFENesin   Syrup  (Sugar-Free) 200 milliGRAM(s) Oral every 6 hours PRN Cough     Patient is a 60y old  Male who presents with a chief complaint of R MCA stroke  01.1 Left Body Involvement (Right Brain) (07 Feb 2021 12:00)      HPI:  61yo male with no pertinent PMH presented with stroke to [facility] 1/29/21. Per daughter, patient had symptoms starting 1/28/21 at 10am in the [facility]. He was seen in ER, had CT and covid test, then he was signed out and took a flight to [place]. Patient got off plane and presented to [facility] ED. Pt with left arm weakness and dysarthria, but denies headache, cp, sob, dizziness, NV. Patient was found to have MRI findings of acute and/or recent subacute R MCA stroke and a parotid lesion. Patient was started on ASA and statin.     Patient was medically stable for discharge to [facility] for multidisciplinary acute rehab 2/3/21.     (03 Feb 2021 14:19)        SUBJECTIVE: Patient seen and examined at bedside while eating breakfast. Reports good appetite. No acute overnight events, slept well. Participating in therapies.  No other complaints.       REVIEW OF SYSTEMS  Constitutional: No fever, No fatigue  HEENT: No eye pain, No visual disturbances  Pulm: No cough,  No shortness of breath  Cardio: No chest pain  GI:  No abdominal pain, No nausea, No vomiting, No diarrhea, No constipation last  2/7  Neuro: No headaches, +memory loss, +loss of strength  MSK: No joint pain, No joint swelling, No muscle pain, No Neck or back pain  Psych:  No depression, No anxiety    VITALS  60y  Vital Signs Last 24 Hrs  T(C): 36.7 (08 Feb 2021 07:33), Max: 36.8 (07 Feb 2021 20:01)  T(F): 98.1 (08 Feb 2021 07:33), Max: 98.2 (07 Feb 2021 20:01)  HR: 55 (08 Feb 2021 07:33) (55 - 60)  BP: 132/75 (08 Feb 2021 07:33) (123/76 - 132/75)  BP(mean): --  RR: 14 (08 Feb 2021 07:33) (14 - 14)  SpO2: 95% (08 Feb 2021 07:33) (94% - 95%)  Daily         Gen - NAD, Comfortable  HEENT - NCAT, EOMI, MMM  Pulm - CTAB, No wheeze  Cardiovascular - RRR, S1S2, No m/r/g  Abdomen - Soft, NT/ND, +BS  Extremities - No C/C/E, No calf tenderness  Neuro-     Cognitive - AAOx4 to person, place, time and situation.     Communication - Fluent, +dysarthria         Cranial Nerves - CN 2-12 intact     Motor -                    LEFT    UE - ShAB 3/5, EF 3/5, EE 3/5, WE 2/5,  2/5                    RIGHT UE - ShAB 5/5, EF 5/5, EE 5/5, WE 5/5,  5/5                    LEFT    LE - HF 4/5, KE 5/5, DF 5/5, PF 5/5                    RIGHT LE - HF 5/5, KE 5/5, DF 5/5, PF 5/5           Coordination - L FTN dysmetria  Psychiatric - Mood stable, Affect WNL        RECENT LABS:                        15.6   10.61 )-----------( 224      ( 08 Feb 2021 07:00 )             46.6     02-08    142  |  105  |  18  ----------------------------<  90  3.7   |  24  |  0.89    Ca    9.2      08 Feb 2021 07:00    TPro  7.9  /  Alb  3.1<L>  /  TBili  0.6  /  DBili  x   /  AST  25  /  ALT  37  /  AlkPhos  97  02-08    LIVER FUNCTIONS - ( 08 Feb 2021 07:00 )  Alb: 3.1 g/dL / Pro: 7.9 g/dL / ALK PHOS: 97 U/L / ALT: 37 U/L / AST: 25 U/L / GGT: x                     MEDICATIONS:  MEDICATIONS  (STANDING):  AQUAPHOR (petrolatum Ointment) 1 Application(s) Topical daily  aspirin enteric coated 81 milliGRAM(s) Oral daily  atorvastatin 40 milliGRAM(s) Oral at bedtime  enoxaparin Injectable 40 milliGRAM(s) SubCutaneous daily  nicotine - 21 mG/24Hr(s) Patch 1 patch Transdermal daily  polyethylene glycol 3350 17 Gram(s) Oral daily  senna 2 Tablet(s) Oral at bedtime    MEDICATIONS  (PRN):  bisacodyl Suppository 10 milliGRAM(s) Rectal daily PRN Constipation  guaiFENesin   Syrup  (Sugar-Free) 200 milliGRAM(s) Oral every 6 hours PRN Cough      < from: US Duplex Carotid Arteries Complete, Bilateral (02.05.21 @ 23:08) >  EXAM:  US DPLX CAROTIDS COMPL BI      PROCEDURE DATE:  02/05/2021        INTERPRETATION:  ULTRASOUND OF THE CAROTID ARTERIES    CLINICAL INDICATION: Right-sided CVA. Evaluate for carotid stenosis.    TECHNIQUE:   Doppler ultrasonography of the carotid arteries was performed utilizing grayscale, color and spectral Doppler.   The magnitude of stenosis was estimated based on established tables of systolic peak velocity related to the magnitude of carotid stenosis.    COMPARISON: No prior studies available for comparison.    FINDINGS:  Scattered plaque formation is identified within the proximal aspect of the right ICA and ECA segments. Plaque formation is identified within the left carotid bulb. Antegrade flow is present in both vertebral arteries.    Velocities expressed in centimeters per second are as follows:    RIGHT:  Proximal CCA = 58.0; Distal CCA = 42.7; Proximal ICA = 60.7; Distal ICA = 55.4;  ECA = 356.6  LEFT:    Proximal CCA = 91.6; Distal CCA = 66.8; Proximal ICA = 138.8; Distal ICA = 60.7;  ECA = 110.1    Right peak systolic IC/CC velocity ratio: 1.5  Left peak systolic IC/CC velocity ratio: 2.1    IMPRESSION:  1.  Right carotid system:  No hemodynamically significant stenosis is found. Note is made of elevated velocity within the right ECA segment.    < end of copied text >

## 2021-02-08 NOTE — PROGRESS NOTE ADULT - ATTENDING COMMENTS
Chart reviewed. Patient seen at bedside  Seated in chair  Comfortable and denies any acute pain or new weakness  Exam - aaox3, left hemiparesis  Labs stable  Patient had carotid doppler and found to have left ICA ( 50-69%) stenosis. Asymptomatic side: Vascular surgery consult requested     2. Continue rehab program

## 2021-02-09 PROCEDURE — 99232 SBSQ HOSP IP/OBS MODERATE 35: CPT

## 2021-02-09 PROCEDURE — 99232 SBSQ HOSP IP/OBS MODERATE 35: CPT | Mod: GC

## 2021-02-09 RX ADMIN — Medication 1 APPLICATION(S): at 12:21

## 2021-02-09 RX ADMIN — Medication 1 PATCH: at 07:26

## 2021-02-09 RX ADMIN — Medication 1 PATCH: at 12:16

## 2021-02-09 RX ADMIN — ATORVASTATIN CALCIUM 40 MILLIGRAM(S): 80 TABLET, FILM COATED ORAL at 21:16

## 2021-02-09 RX ADMIN — Medication 81 MILLIGRAM(S): at 12:18

## 2021-02-09 RX ADMIN — Medication 1 PATCH: at 12:18

## 2021-02-09 RX ADMIN — ENOXAPARIN SODIUM 40 MILLIGRAM(S): 100 INJECTION SUBCUTANEOUS at 12:18

## 2021-02-09 RX ADMIN — SENNA PLUS 2 TABLET(S): 8.6 TABLET ORAL at 21:16

## 2021-02-09 RX ADMIN — Medication 1 PATCH: at 17:24

## 2021-02-09 NOTE — CONSULT NOTE ADULT - SUBJECTIVE AND OBJECTIVE BOX
History of Present Illness:  60y.o Male with a history of  smoking was admitted to rehab following a right CVA with left arm weakness. Carotid doppler and cerebral angiogram revealed minimal plaque of the right ICA and moderate 50-69% stenosis of the left ICA. He is currently on aspirin and statin therapy. I was requested to evaluate his carotid circulation.    PAST MEDICAL & SURGICAL HISTORY:  No pertinent past medical history    No significant past surgical history        Allergies    No Known Allergies    Intolerances        MEDICATIONS  (STANDING):  AQUAPHOR (petrolatum Ointment) 1 Application(s) Topical daily  aspirin enteric coated 81 milliGRAM(s) Oral daily  atorvastatin 40 milliGRAM(s) Oral at bedtime  enoxaparin Injectable 40 milliGRAM(s) SubCutaneous daily  nicotine - 21 mG/24Hr(s) Patch 1 patch Transdermal daily  polyethylene glycol 3350 17 Gram(s) Oral daily  senna 2 Tablet(s) Oral at bedtime    MEDICATIONS  (PRN):  bisacodyl Suppository 10 milliGRAM(s) Rectal daily PRN Constipation  guaiFENesin   Syrup  (Sugar-Free) 200 milliGRAM(s) Oral every 6 hours PRN Cough      Social History:  Smoking History: ++smoking hx.  Alcohol Use: social    REVIEW OF SYSTEMS:  CONSTITUTIONAL: No  fevers or chills  EYES/ENT: No visual changes;  No vertigo or throat pain   NECK: No pain or stiffness  RESPIRATORY: No cough, wheezing, hemoptysis; No shortness of breath  CARDIOVASCULAR: No chest pain or palpitations  GASTROINTESTINAL: No abdominal or epigastric pain. No nausea, vomiting, or hematemesis; No diarrhea or constipation. No melena or hematochezia.  GENITOURINARY: No dysuria, frequency or hematuria  NEUROLOGICAL:  ++ weakness left arm with dysarthric speech  SKIN: No itching, burning, rashes, or lesions   Vascular:  No lower extremity claudication, pedal rest pain or digital ulcers  All other review of systems is negative unless indicated above.    PHYSICAL EXAM:  General:  On exam, the patient is alert nontoxic appearing Male in no acute distress.  Vital Signs Last 24 Hrs  T(C): 36.9 (08 Feb 2021 19:55), Max: 36.9 (08 Feb 2021 19:55)  T(F): 98.4 (08 Feb 2021 19:55), Max: 98.4 (08 Feb 2021 19:55)  HR: 57 (09 Feb 2021 07:38) (57 - 76)  BP: 128/71 (09 Feb 2021 07:38) (121/75 - 128/71)  BP(mean): --  RR: 15 (09 Feb 2021 07:38) (14 - 15)  SpO2: 95% (09 Feb 2021 07:38) (95% - 95%)    Neck:  4+/4+ bilateral carotid pulses; ++ soft left  carotid bruit, no palpable cervical masses.  Heart:  Regular, no murmurs, rubs or gallops.    Lungs:  Clear to auscultation.    Chest:  No chest wall deformities.  Symmetrical chest expansion.   Abdomen: Soft and nontender.  No palpable masses.  No rebound, guarding or rigidity.  Extremities:   Both feet are warm, pink with normal capillary refill times.  There are no digital ulcers or heel decubiti.  The calf and thigh muscles are soft and nontender.  There are no palpable cords or limb cellulitis.  Farshad's sign is negative bilaterally.  There is no lower extremity edema, cyanosis, or rubor.  On examination of the peripheral pulses:  Left leg femoral pulse is  4/4  , popliteal pulse is 4/4   ,PT Pulse is 2/4   , DP Pulse is 2/4   Right leg femoral pulse is 4/4   ,popliteal pulse is 4/4    , PT Pulse is 2/4  , DP Pulse is 2/4   Neurological:  There  is motor weakness in the left  upper  extremity with mild dysarthric speech                          15.6   10.61 )-----------( 224      ( 08 Feb 2021 07:00 )             46.6     02-08    142  |  105  |  18  ----------------------------<  90  3.7   |  24  |  0.89    Ca    9.2      08 Feb 2021 07:00    TPro  7.9  /  Alb  3.1<L>  /  TBili  0.6  /  DBili  x   /  AST  25  /  ALT  37  /  AlkPhos  97  02-08            Radiology:

## 2021-02-09 NOTE — PROGRESS NOTE ADULT - ATTENDING COMMENTS
Chart reviewed. Patient seen at bedside.   Denies any new issues overnight.   Slept well  Patient seen by Vascular surgery for left carotid stenosis. FU as outpatient and surveillance dopplers    2. Team conference today: goals of Mod I TDD 2/13 Chart reviewed. Patient seen at bedside.   Denies any new issues overnight.   Slept well  Patient seen by Vascular surgery for left carotid stenosis. FU as outpatient and surveillance dopplers    2. Team conference today: goals of Mod I TDD 2/13  Therapy to be adjusted - ST - 30 Minutes, OT - 90 minutes and PT 60 minutes

## 2021-02-09 NOTE — PROGRESS NOTE ADULT - ASSESSMENT
Assessment/Plan:  SHAWNA LAURENT is a 60y with no significant PMHx presented to [facility] 1/29/21 for stroke symptoms that began the day before in [place]. He signed out of ED, flew to [place], and went to [facility]. MRI showed R MCA stroke.       #R MCA CVA with Left sided weakness  - Continue ASA and Lipitor  - F/u neuro, Dr. Mota, outpatient  - [facility] comprehensive rehab program, PT/OT/SLP 3 hours a day, 5 days a week.  - Precautions: falls, aspiration    #Left Carotid Artery stenosis  - BL Carotid Doppler US: 50-69% L ICA stenosis  - vascular surgery consult 2/9 appreciated: pt to follow up as outpatient with surveillance carotid dopplers Q6 months to monitor progresion    #Tobacco dependence  - continue nicotine patch     #Intraparotid Nodules  - F/u with PCP outpatient    GI/Bowel  - senna qhs, miralax  - dulcolax supp PRN    /Bladder:   - Continent-- voiding well    Skin/Pressure Injury:   - At risk for pressure injury due to neurologic diagnosis and relative immobility.  - Skin assessment on admission: intact  - air mattress  - Soft heel protectors  - Skin barrier cream as needed  - Nursing to monitor skin Qshift    Diet/Dysphagia:  - Diet Consistency/Modifications: mech soft, DASH/TLC  - Aspiration Precautions  - SLP consult for swallow function evaluation and treatment    DVT ppx:  - Lovenox  - SCDs  ---------------  Outpatient Follow-up (Specialty/Name of physician):  Alfredo Mota  NEUROLOGY  [address] [place] [address] [phone]  Phone: [phone]  Fax: [phone]  ---  Family updated 2/8: Spoke with daughter Laura ([phone]). Pt's wife is having surgery 2/9. Updated her on carotid US results. Assessment/Plan:  SHAWNA LAURENT is a 60y with no significant PMHx presented to [facility] 1/29/21 for stroke symptoms that began the day before in [place]. He signed out of ED, flew to [place], and went to [facility]. MRI showed R MCA stroke.       #R MCA CVA with Left sided weakness  - Continue ASA and Lipitor  - F/u neuro, Dr. Mota, outpatient  - [facility] comprehensive rehab program, PT/OT/SLP 3 hours a day, 5 days a week.  - Precautions: falls, aspiration    #Left Carotid Artery stenosis  - BL Carotid Doppler US: 50-69% L ICA stenosis  - vascular surgery consult 2/9 appreciated: pt to follow up as outpatient with surveillance carotid dopplers Q6 months to monitor progresion    #Tobacco dependence  - continue nicotine patch     #Intraparotid Nodules  - F/u with PCP outpatient    GI/Bowel  - senna qhs, miralax  - dulcolax supp PRN    /Bladder:   - Continent-- voiding well    Skin/Pressure Injury:   - At risk for pressure injury due to neurologic diagnosis and relative immobility.  - Skin assessment on admission: intact  - air mattress  - Soft heel protectors  - Skin barrier cream as needed  - Nursing to monitor skin Qshift    Diet/Dysphagia:  - Diet Consistency/Modifications: mech soft, DASH/TLC  - Aspiration Precautions  - SLP consult for swallow function evaluation and treatment    DVT ppx:  - Lovenox  - SCDs  ---------------  Outpatient Follow-up (Specialty/Name of physician):  Alfredo Mota  NEUROLOGY  [address] [place] [address] [id number]  Phone: [phone]  Fax: [phone]  ---  IDT meeting 2/9:  SW: Pt lives in private home wuth daughter and spouse. 5 MARLENE and flight to bedroom. Independent prior to admission. Working.  OT: Setup for eating and grooming. CG for bathroom transfers. Impaired coordination, decreased balance, left inattention.  PT: Close supervision for transfers. Walks 150 ft with RW and close supervision. 12 steps with 1 HR and CG. Goals: mod-I for transfers and gait  SLP: mod I for communication. Decreased memory and recall  ELOD: 2/13. Dc with outpatient PT/OT/SLP  ---  Family updated 2/8: Spoke with daughter Laura ([phone]). Pt's wife is having surgery 2/9. Updated her on carotid US results. Assessment/Plan:  SHAWNA LAURENT is a 60y with no significant PMHx presented to [facility] 1/29/21 for stroke symptoms that began the day before in [place]. He signed out of ED, flew to [place], and went to [facility]. MRI showed R MCA stroke.       #R MCA CVA with Left sided weakness  - Continue ASA and Lipitor  - F/u neuro, Dr. Mota, outpatient  - [facility] comprehensive rehab program, PT/OT/SLP 3 hours a day, 5 days a week.  - Precautions: falls, aspiration    #Left Carotid Artery stenosis  - BL Carotid Doppler US: 50-69% L ICA stenosis  - vascular surgery consult 2/9 appreciated: pt to follow up as outpatient with surveillance carotid dopplers Q6 months to monitor progresion    #Tobacco dependence  - continue nicotine patch     #Intraparotid Nodules  - F/u with PCP outpatient    GI/Bowel  - senna qhs, miralax  - dulcolax supp PRN    /Bladder:   - Continent-- voiding well    Skin/Pressure Injury:   - At risk for pressure injury due to neurologic diagnosis and relative immobility.  - Skin assessment on admission: intact  - air mattress  - Soft heel protectors  - Skin barrier cream as needed  - Nursing to monitor skin Qshift    Diet/Dysphagia:  - Diet Consistency/Modifications: mech soft, DASH/TLC  - Aspiration Precautions  - SLP consult for swallow function evaluation and treatment    DVT ppx:  - Lovenox  - SCDs  ---------------  Outpatient Follow-up (Specialty/Name of physician):  Alfredo Mota  NEUROLOGY  [address] [place] [address] [phone]  Phone: [phone]  Fax: [phone]  ---  IDT meeting 2/9:  SW: Pt lives in private home with daughter and spouse. 5 MARLENE and flight to bedroom. Independent prior to admission. Working.  OT: Setup for eating and grooming. CG for bathroom transfers. Impaired coordination, decreased balance, left inattention.  PT: Close supervision for transfers. Walks 150 ft with RW and close supervision. 12 steps with 1 HR and CG. Goals: mod-I for transfers and gait  SLP: mod I for communication. Decreased memory and recall  ELOD: 2/13. Dc with outpatient PT/OT/SLP  ---  Family updated 2/9: Spoke with daughter Laura ([phone]). Daughter requesting at home therapies due to COVID but is also willing to take him to outpatient therapies.

## 2021-02-09 NOTE — CONSULT NOTE ADULT - ASSESSMENT
60 y.o. smoker with recent right cerebral infarct has a asymptomatic moderate 50-69%  stenosis of the left ICA. No vascular interventions are needed. I would continue medical management with daily aspirin and statin. He was advised to stop smoking and is on a Nicotine patch. He will follow up as a outpatient and have serial carotid dopplers q 6 months to follow the progression of the left ICA stenosis. The patient was given informed consent.
61 y/o M with PMHx chronic tobacco use presented to [facility] 1/29/21 for stroke symptoms that began the day before in [place]. He signed out of ED, flew to [place], and went to [facility]. MRI showed R MCA stroke. Patient admitted to [facility] for rehab.     #R MCA CVA with Left sided weakness  -Continue comprehensive rehab program -PT/OT/SLP per rehab team  -Pain management, bowel regimen per rehab   -Continue ASA and Lipitor  -F/u neuro, Dr. Mota, outpatient  -Aspiration, fall, safety, seizure precautions  -SLP, nutrition     #Intraparotid Nodules  -F/u with PCP outpatient    #Tobacco use  -Smoking cessation counseling provided  -Nicotine patch    #DVT ppx - lovenox

## 2021-02-09 NOTE — PROGRESS NOTE ADULT - SUBJECTIVE AND OBJECTIVE BOX
Patient is a 60y old  Male who presents with a chief complaint of R MCA stroke  01.1 Left Body Involvement (Right Brain) (09 Feb 2021 11:30)      HPI:  59yo male with no pertinent PMH presented with stroke to [facility] 1/29/21. Per daughter, patient had symptoms starting 1/28/21 at 10am in the [facility]. He was seen in ER, had CT and covid test, then he was signed out and took a flight to [place]. Patient got off plane and presented to [facility] ED. Pt with left arm weakness and dysarthria, but denies headache, cp, sob, dizziness, NV. Patient was found to have MRI findings of acute and/or recent subacute R MCA stroke and a parotid lesion. Patient was started on ASA and statin.     Patient was medically stable for discharge to [place] for multidisciplinary acute rehab 2/3/21.     (03 Feb 2021 14:19)        SUBJECTIVE: Patient seen and examined at bedside. Reports good appetite and good sleep. Pleased his diet was upgraded.  Patient counseled on smoking cessation and risk factor of tobacco use in CVA. Denies prior attempts to quit smoking. 1 PPD hx  Pt seen by vascular surgeon, aware he will need to follow up as outpatient for left carotid stenosis.  No acute overnight events.  No other complaints.       REVIEW OF SYSTEMS  Constitutional: No fever, No fatigue  HEENT: No eye pain, No visual disturbances  Pulm: No cough,  No shortness of breath  Cardio: No chest pain  GI:  No abdominal pain, No nausea, No vomiting, No diarrhea, No constipation, last BM 2/8  Neuro: No headaches, +memory loss, +loss of strength  MSK: No joint pain, No joint swelling, No muscle pain, No Neck or back pain  Psych:  No depression, No anxiety      VITALS  60y  Vital Signs Last 24 Hrs  T(C): 36.9 (08 Feb 2021 19:55), Max: 36.9 (08 Feb 2021 19:55)  T(F): 98.4 (08 Feb 2021 19:55), Max: 98.4 (08 Feb 2021 19:55)  HR: 57 (09 Feb 2021 07:38) (57 - 76)  BP: 128/71 (09 Feb 2021 07:38) (121/75 - 128/71)  BP(mean): --  RR: 15 (09 Feb 2021 07:38) (14 - 15)  SpO2: 95% (09 Feb 2021 07:38) (95% - 95%)  Daily     Daily         Gen - NAD, Comfortable  HEENT - NCAT, EOMI, MMM, soft left carotid bruit  Pulm - no respiratory distress, breathing comfortably on room air  Cardiovascular - warm and well perfused, pulse regular  Abdomen - Soft, NT/ND, +BS  Extremities - No C/C/E, No calf tenderness  Neuro-     Cognitive - AAOx4 to person, place, time and situation.     Communication - Fluent, +dysarthria         Cranial Nerves - CN 2-12 intact     Motor -                    LEFT    UE - ShAB 3/5, EF 3/5, EE 3/5, WE 2/5,  2/5                    RIGHT UE - ShAB 5/5, EF 5/5, EE 5/5, WE 5/5,  5/5                    LEFT    LE - HF 4/5, KE 5/5, DF 5/5, PF 5/5                    RIGHT LE - HF 5/5, KE 5/5, DF 5/5, PF 5/5           Coordination - L FTN dysmetria  Psychiatric - Mood stable, Affect WNL        RECENT LABS:                        15.6   10.61 )-----------( 224      ( 08 Feb 2021 07:00 )             46.6     02-08    142  |  105  |  18  ----------------------------<  90  3.7   |  24  |  0.89    Ca    9.2      08 Feb 2021 07:00    TPro  7.9  /  Alb  3.1<L>  /  TBili  0.6  /  DBili  x   /  AST  25  /  ALT  37  /  AlkPhos  97  02-08    LIVER FUNCTIONS - ( 08 Feb 2021 07:00 )  Alb: 3.1 g/dL / Pro: 7.9 g/dL / ALK PHOS: 97 U/L / ALT: 37 U/L / AST: 25 U/L / GGT: x                   CAPILLARY BLOOD GLUCOSE            MEDICATIONS:  MEDICATIONS  (STANDING):  AQUAPHOR (petrolatum Ointment) 1 Application(s) Topical daily  aspirin enteric coated 81 milliGRAM(s) Oral daily  atorvastatin 40 milliGRAM(s) Oral at bedtime  enoxaparin Injectable 40 milliGRAM(s) SubCutaneous daily  nicotine - 21 mG/24Hr(s) Patch 1 patch Transdermal daily  polyethylene glycol 3350 17 Gram(s) Oral daily  senna 2 Tablet(s) Oral at bedtime    MEDICATIONS  (PRN):  bisacodyl Suppository 10 milliGRAM(s) Rectal daily PRN Constipation  guaiFENesin   Syrup  (Sugar-Free) 200 milliGRAM(s) Oral every 6 hours PRN Cough

## 2021-02-09 NOTE — PROGRESS NOTE ADULT - SUBJECTIVE AND OBJECTIVE BOX
Medicine Progress Note    Patient is a 60y old  Male who presents with a chief complaint of R MCA stroke  01.1 Left Body Involvement (Right Brain) (09 Feb 2021 08:15)      SUBJECTIVE / OVERNIGHT EVENTS: pt seen and evaluated at bedside. no acute changes overnight. pt is OOB in wheelchair eating breakfast    MEDICATIONS  (STANDING):  AQUAPHOR (petrolatum Ointment) 1 Application(s) Topical daily  aspirin enteric coated 81 milliGRAM(s) Oral daily  atorvastatin 40 milliGRAM(s) Oral at bedtime  enoxaparin Injectable 40 milliGRAM(s) SubCutaneous daily  nicotine - 21 mG/24Hr(s) Patch 1 patch Transdermal daily  polyethylene glycol 3350 17 Gram(s) Oral daily  senna 2 Tablet(s) Oral at bedtime    MEDICATIONS  (PRN):  bisacodyl Suppository 10 milliGRAM(s) Rectal daily PRN Constipation  guaiFENesin   Syrup  (Sugar-Free) 200 milliGRAM(s) Oral every 6 hours PRN Cough    CAPILLARY BLOOD GLUCOSE        I&O's Summary    09 Feb 2021 07:01  -  09 Feb 2021 11:30  --------------------------------------------------------  IN: 240 mL / OUT: 200 mL / NET: 40 mL        PHYSICAL EXAM:  Vital Signs Last 24 Hrs  T(C): 36.9 (08 Feb 2021 19:55), Max: 36.9 (08 Feb 2021 19:55)  T(F): 98.4 (08 Feb 2021 19:55), Max: 98.4 (08 Feb 2021 19:55)  HR: 57 (09 Feb 2021 07:38) (57 - 76)  BP: 128/71 (09 Feb 2021 07:38) (121/75 - 128/71)  BP(mean): --  RR: 15 (09 Feb 2021 07:38) (14 - 15)  SpO2: 95% (09 Feb 2021 07:38) (95% - 95%)    CONSTITUTIONAL: NAD  ENMT: Moist oral mucosa, no pharyngeal injection or exudates; normal dentition  RESPIRATORY: Normal respiratory effort; lungs are clear to auscultation bilaterally  CARDIOVASCULAR: Regular rate and rhythm, normal S1 and S2, no murmur/rub/gallop; No lower extremity edema; Peripheral pulses are 2+ bilaterally  ABDOMEN: Nontender to palpation, normoactive bowel sounds, no rebound/guarding; No hepatosplenomegaly  PSYCH: A+O to person, place, and time; affect appropriate  EXT: no calf tenderness, no pitting edema b/l. +LUE, LLE weakness    LABS:                        15.6   10.61 )-----------( 224      ( 08 Feb 2021 07:00 )             46.6     02-08    142  |  105  |  18  ----------------------------<  90  3.7   |  24  |  0.89    Ca    9.2      08 Feb 2021 07:00    TPro  7.9  /  Alb  3.1<L>  /  TBili  0.6  /  DBili  x   /  AST  25  /  ALT  37  /  AlkPhos  97  02-08              COVID-19 PCR: NotDetec (03 Feb 2021 16:45)      RADIOLOGY & ADDITIONAL TESTS:  Imaging from Last 24 Hours:    Electrocardiogram/QTc Interval:    COORDINATION OF CARE:  Care Discussed with Consultants/Other Providers:   Medicine Progress Note    Patient is a 60y old  Male who presents with a chief complaint of R MCA stroke  01.1 Left Body Involvement (Right Brain) (09 Feb 2021 08:15)      SUBJECTIVE / OVERNIGHT EVENTS: pt seen and evaluated at bedside. no acute changes overnight. pt is OOB in wheelchair eating breakfast    MEDICATIONS  (STANDING):  AQUAPHOR (petrolatum Ointment) 1 Application(s) Topical daily  aspirin enteric coated 81 milliGRAM(s) Oral daily  atorvastatin 40 milliGRAM(s) Oral at bedtime  enoxaparin Injectable 40 milliGRAM(s) SubCutaneous daily  nicotine - 21 mG/24Hr(s) Patch 1 patch Transdermal daily  polyethylene glycol 3350 17 Gram(s) Oral daily  senna 2 Tablet(s) Oral at bedtime    MEDICATIONS  (PRN):  bisacodyl Suppository 10 milliGRAM(s) Rectal daily PRN Constipation  guaiFENesin   Syrup  (Sugar-Free) 200 milliGRAM(s) Oral every 6 hours PRN Cough    CAPILLARY BLOOD GLUCOSE        I&O's Summary    09 Feb 2021 07:01  -  09 Feb 2021 11:30  --------------------------------------------------------  IN: 240 mL / OUT: 200 mL / NET: 40 mL        PHYSICAL EXAM:  Vital Signs Last 24 Hrs  T(C): 36.9 (08 Feb 2021 19:55), Max: 36.9 (08 Feb 2021 19:55)  T(F): 98.4 (08 Feb 2021 19:55), Max: 98.4 (08 Feb 2021 19:55)  HR: 57 (09 Feb 2021 07:38) (57 - 76)  BP: 128/71 (09 Feb 2021 07:38) (121/75 - 128/71)  BP(mean): --  RR: 15 (09 Feb 2021 07:38) (14 - 15)  SpO2: 95% (09 Feb 2021 07:38) (95% - 95%)    CONSTITUTIONAL: NAD  ENMT: Moist oral mucosa,   RESPIRATORY: Normal respiratory effort; lungs are clear to auscultation bilaterally  CARDIOVASCULAR: Regular rate and rhythm, normal S1 and S2, no murmur/rub/gallop; No lower extremity edema; Peripheral pulses are 2+ bilaterally  ABDOMEN: Nontender to palpation, normoactive bowel sounds, no rebound/guarding; No hepatosplenomegaly  PSYCH: A+O to person, place, and time; affect appropriate  EXT: no calf tenderness, no pitting edema b/l. +LUE, LLE weakness    LABS:                        15.6   10.61 )-----------( 224      ( 08 Feb 2021 07:00 )             46.6     02-08    142  |  105  |  18  ----------------------------<  90  3.7   |  24  |  0.89    Ca    9.2      08 Feb 2021 07:00    TPro  7.9  /  Alb  3.1<L>  /  TBili  0.6  /  DBili  x   /  AST  25  /  ALT  37  /  AlkPhos  97  02-08              COVID-19 PCR: NotDetec (03 Feb 2021 16:45)      RADIOLOGY & ADDITIONAL TESTS:  Imaging from Last 24 Hours:    Electrocardiogram/QTc Interval:    COORDINATION OF CARE:  Care Discussed with Consultants/Other Providers:

## 2021-02-09 NOTE — PROGRESS NOTE ADULT - ASSESSMENT
61 y/o M with PMHx chronic tobacco use presented to [facility] 1/29/21 for stroke symptoms that began the day before in [place]. He signed out of ED, flew to [place], and went to [facility]. MRI showed R MCA stroke. Patient admitted to [facility] for [facility].     #R MCA CVA with Left sided weakness  -Continue comprehensive rehab program -PT/OT/SLP per rehab team  -Pain management, bowel regimen per rehab   -Continue ASA and Lipitor  -F/u neuro, Dr. Mota, outpatient  -Aspiration, fall, safety, seizure precautions    #Intraparotid Nodules  -F/u with PCP outpatient    #Tobacco use  -Nicotine patch    #DVT ppx - lovenox   59 y/o M with PMHx chronic tobacco use presented to [facility] 1/29/21 for stroke symptoms that began the day before in [place]. He signed out of ED, flew to [place], and went to [facility]. MRI showed R MCA stroke. Patient admitted to [facility] for rehab.     #R MCA CVA with Left sided weakness  # Moderate left ICA stenosis  -Continue comprehensive rehab program -PT/OT/SLP per rehab team  -Pain management, bowel regimen per rehab   -Continue ASA and Lipitor  - seen by vascular 2/9. no intervention needed. risk factor management. surveillance carotid dopppler Q 6hrs.  - vascular follow up OP after DC   -F/u neuro, Dr. Mota, outpatient  -Aspiration, fall, safety, seizure precautions      #Intraparotid Nodules  -F/u with PCP outpatient    #Tobacco use  -Nicotine patch  - counselled    #DVT ppx - lovenox

## 2021-02-10 PROCEDURE — 99232 SBSQ HOSP IP/OBS MODERATE 35: CPT

## 2021-02-10 RX ADMIN — Medication 1 APPLICATION(S): at 11:59

## 2021-02-10 RX ADMIN — Medication 81 MILLIGRAM(S): at 11:56

## 2021-02-10 RX ADMIN — Medication 1 PATCH: at 07:37

## 2021-02-10 RX ADMIN — SENNA PLUS 2 TABLET(S): 8.6 TABLET ORAL at 20:46

## 2021-02-10 RX ADMIN — ATORVASTATIN CALCIUM 40 MILLIGRAM(S): 80 TABLET, FILM COATED ORAL at 20:46

## 2021-02-10 RX ADMIN — Medication 1 PATCH: at 17:19

## 2021-02-10 RX ADMIN — ENOXAPARIN SODIUM 40 MILLIGRAM(S): 100 INJECTION SUBCUTANEOUS at 11:56

## 2021-02-10 RX ADMIN — Medication 1 PATCH: at 12:00

## 2021-02-10 RX ADMIN — Medication 1 PATCH: at 11:55

## 2021-02-10 NOTE — PROGRESS NOTE ADULT - ASSESSMENT
Assessment/Plan:  60y with no significant PMHx presented to [facility] 1/29/21 for stroke symptoms that began the day before in [place]. He signed out of ED, flew to [place], and went to [facility]. MRI showed R MCA stroke.       R MCA CVA with Left sided weakness  - Continue ASA and Lipitor  - F/u neuro, Dr. Mota, outpatient  - [facility] comprehensive rehab program, PT/OT/SLP 3 hours a day, 5 days a week.  - Precautions: falls, aspiration    Left Carotid Artery stenosis  - BL Carotid Doppler US: 50-69% L ICA stenosis  - vascular surgery consult 2/9 appreciated: pt to follow up as outpatient with surveillance carotid dopplers Q6 months to monitor progresion    Smoking:- continue nicotine patch and wean off as outpatient. Smoking cessation reiterated with patient     Intraparotid Nodules:- F/u with PCP outpatient    GI/Bowel:- senna qhs, miralax  - dulcolax supp PRN    /Bladder:   - Continent-- voiding well    Skin/Pressure Injury:     Diet/Dysphagia:now on regular with thin   -  DVT ppx:- Lovenox      Outpatient Follow-up (Specialty/Name of physician):  Alfredo Mota  NEUROLOGY  [address] [place] [address] [id number]  Phone: [phone]  Fax: [phone]  ---  IDT meeting 2/9:  SW: Pt lives in private home with daughter and spouse. 5 MARLENE and flight to bedroom. Independent prior to admission. Working.  OT: Setup for eating and grooming. CG for bathroom transfers. Impaired coordination, decreased balance, left inattention.  PT: Close supervision for transfers. Walks 150 ft with RW and close supervision. 12 steps with 1 HR and CG. Goals: mod-I for transfers and gait  SLP: mod I for communication. Decreased memory and recall  ELOD: 2/13. Dc with home care ( family preference)   ---

## 2021-02-10 NOTE — PROGRESS NOTE ADULT - SUBJECTIVE AND OBJECTIVE BOX
Medicine Progress Note    Patient is a 60y old  Male who presents with a chief complaint of R MCA stroke  01.1 Left Body Involvement (Right Brain) (09 Feb 2021 12:16)      SUBJECTIVE / OVERNIGHT EVENTS: pt seen and evaluated at bedside. no overnight events. pt is OOB in wheelchair.      MEDICATIONS  (STANDING):  AQUAPHOR (petrolatum Ointment) 1 Application(s) Topical daily  aspirin enteric coated 81 milliGRAM(s) Oral daily  atorvastatin 40 milliGRAM(s) Oral at bedtime  enoxaparin Injectable 40 milliGRAM(s) SubCutaneous daily  nicotine - 21 mG/24Hr(s) Patch 1 patch Transdermal daily  polyethylene glycol 3350 17 Gram(s) Oral daily  senna 2 Tablet(s) Oral at bedtime    MEDICATIONS  (PRN):  bisacodyl Suppository 10 milliGRAM(s) Rectal daily PRN Constipation  guaiFENesin   Syrup  (Sugar-Free) 200 milliGRAM(s) Oral every 6 hours PRN Cough    CAPILLARY BLOOD GLUCOSE        I&O's Summary    09 Feb 2021 07:01  -  10 Feb 2021 07:00  --------------------------------------------------------  IN: 1060 mL / OUT: 1300 mL / NET: -240 mL        PHYSICAL EXAM:  Vital Signs Last 24 Hrs  T(C): 36.8 (09 Feb 2021 21:19), Max: 36.8 (09 Feb 2021 21:19)  T(F): 98.3 (09 Feb 2021 21:19), Max: 98.3 (09 Feb 2021 21:19)  HR: 86 (10 Feb 2021 07:39) (85 - 86)  BP: 119/80 (10 Feb 2021 07:39) (110/72 - 119/80)  BP(mean): --  RR: 15 (10 Feb 2021 07:39) (15 - 15)  SpO2: 95% (10 Feb 2021 07:39) (95% - 95%)    CONSTITUTIONAL: NAD  ENMT: Moist oral mucosa,   RESPIRATORY: Normal respiratory effort; lungs are clear to auscultation bilaterally  CARDIOVASCULAR: Regular rate and rhythm, normal S1 and S2, no murmur/rub/gallop; No lower extremity edema  ABDOMEN: Nontender to palpation, no rebound/guarding  PSYCH: A+O to person, place, and time; affect appropriate  EXT: no calf tenderness, no pitting edema b/l. +LUE, LLE weakness      LABS:                    COVID-19 PCR: NotDetec (03 Feb 2021 16:45)      RADIOLOGY & ADDITIONAL TESTS:  Imaging from Last 24 Hours:    Electrocardiogram/QTc Interval:    COORDINATION OF CARE:  Care Discussed with Consultants/Other Providers:

## 2021-02-10 NOTE — PROGRESS NOTE ADULT - ASSESSMENT
59 y/o M with PMHx chronic tobacco use presented to [facility] 1/29/21 for stroke symptoms that began the day before in [place]. He signed out of ED, flew to [place], and went to [facility]. MRI showed R MCA stroke. Patient admitted to [facility] for [facility].     #R MCA CVA with Left sided weakness  #Moderate left ICA stenosis  -Continue comprehensive rehab program -PT/OT/SLP per rehab team  -Pain management, bowel regimen per rehab   -Continue ASA and Lipitor  -seen by vascular 2/9. no intervention needed. risk factor management. surveillance carotid doppler Q 6 months  -F/u vascular, outpatient   -F/u neuro, Dr. Mota, outpatient  -Aspiration, fall, safety, seizure precautions    #Intraparotid Nodules  -F/u with PCP outpatient    #Tobacco use  -Nicotine patch  -counseled    #DVT ppx - lovenox   61 y/o M with PMHx chronic tobacco use presented to [facility] 1/29/21 for stroke symptoms that began the day before in [place]. He signed out of ED, flew to [place], and went to [facility]. MRI showed R MCA stroke. Patient admitted to [facility] for [facility].     #R MCA CVA with Left sided weakness  #Moderate left ICA stenosis  -Continue comprehensive rehab program -PT/OT/SLP per rehab team  -Pain management, bowel regimen per rehab   -Continue ASA and Lipitor  -seen by vascular 2/9. no intervention needed. risk factor management. surveillance carotid doppler Q 6 months  -F/u vascular, outpatient   -F/u neuro, Dr. Mota, outpatient  -Aspiration, fall, safety, seizure precautions    #Intraparotid Nodules  -F/u with PCP outpatient    #Tobacco use  -Nicotine patch  -counseled    #DVT ppx - lovenox      DC planned for Saturday 61 y/o M with PMHx chronic tobacco use presented to [facility] 1/29/21 for stroke symptoms that began the day before in [place]. He signed out of ED, flew to [place], and went to [facility]. MRI showed R MCA stroke. Patient admitted to [facility] for [facility].     #R MCA CVA with Left sided weakness  #Moderate left ICA stenosis  -Continue comprehensive rehab program -PT/OT/SLP per rehab team  -Pain management, bowel regimen per rehab   -Continue ASA and Lipitor  -seen by vascular 2/9. no intervention needed. risk factor management. surveillance carotid doppler Q 6 months  -F/u vascular, outpatient   -F/u neuro, Dr. Mota, outpatient  -Aspiration, fall, safety, seizure precautions    #Intraparotid Nodules  -F/u with PCP outpatient    # leucocytosis  - monitor  - no ssx if infection    #Tobacco use  -Nicotine patch  -counseled    # hypoalbuminemia  # obesity  - weight loss  - protien rich diet  - nutrition eval ongoing    #DVT ppx - lovenox      DC planned for Saturday

## 2021-02-10 NOTE — PROGRESS NOTE ADULT - SUBJECTIVE AND OBJECTIVE BOX
Patient is a 60y old  Male who presents with a chief complaint of R MCA stroke  01.1 Left Body Involvement (Right Brain) (09 Feb 2021 11:30)      HPI:  61yo male with no pertinent PMH presented with stroke to [facility] 1/29/21. Per daughter, patient had symptoms starting 1/28/21 at 10am in the [facility]. He was seen in ER, had CT and covid test, then he was signed out and took a flight to [place]. Patient got off plane and presented to [facility] ED. Pt with left arm weakness and dysarthria, but denies headache, cp, sob, dizziness, NV. Patient was found to have MRI findings of acute and/or recent subacute R MCA stroke and a parotid lesion. Patient was started on ASA and statin.     Patient was medically stable for discharge to [facility] for multidisciplinary acute rehab 2/3/21.     (03 Feb 2021 14:19)        SUBJECTIVE: Patient seen and examined at bedside. No acute overnight events.        REVIEW OF SYSTEMS  Constitutional: No fever, No fatigue  HEENT: No eye pain,   Pulm: No cough,  No shortness of breath  Cardio: No chest pain  GI:  No abdominal pain,  last BM 2/9  Neuro: No headaches,   MSK: No joint pain,  Psych:  No depression, No anxiety    Vital Signs Last 24 Hrs  T(C): 36.8 (09 Feb 2021 21:19), Max: 36.8 (09 Feb 2021 21:19)  T(F): 98.3 (09 Feb 2021 21:19), Max: 98.3 (09 Feb 2021 21:19)  HR: 86 (10 Feb 2021 07:39) (85 - 86)  BP: 119/80 (10 Feb 2021 07:39) (110/72 - 119/80)  BP(mean): --  RR: 15 (10 Feb 2021 07:39) (15 - 15)  SpO2: 95% (10 Feb 2021 07:39) (95% - 95%)      Gen - NAD, Comfortable  pulm -clear   Cardiovascular -S1S2  Abdomen - Soft, NT/ND, +BS  Extremities - No C/C/E, No calf tenderness  Neuro-     Cognitive - AAOx4     Communication - Fluent, +dysarthria         Mild left facial droop     Motor -                    LEFT    UE - ShAB 3/5, EF 3/5, EE 3/5, WE 2/5,  2/5                    RIGHT UE - ShAB 5/5, EF 5/5, EE 5/5, WE 5/5,  5/5                    LEFT    LE - HF 4/5, KE 5/5, DF 5/5, PF 5/5                    RIGHT LE - HF 5/5, KE 5/5, DF 5/5, PF 5/5           Coordination - L FTN dysmetria  Psychiatric - Mood stable, Affect WNL        RECENT LABS:                        15.6   10.61 )-----------( 224      ( 08 Feb 2021 07:00 )             46.6     02-08    142  |  105  |  18  ----------------------------<  90  3.7   |  24  |  0.89    Ca    9.2      08 Feb 2021 07:00    TPro  7.9  /  Alb  3.1<L>  /  TBili  0.6  /  DBili  x   /  AST  25  /  ALT  37  /  AlkPhos  97  02-08    LIVER FUNCTIONS - ( 08 Feb 2021 07:00 )  Alb: 3.1 g/dL / Pro: 7.9 g/dL / ALK PHOS: 97 U/L / ALT: 37 U/L / AST: 25 U/L / GGT: x               MEDICATIONS  (STANDING):  AQUAPHOR (petrolatum Ointment) 1 Application(s) Topical daily  aspirin enteric coated 81 milliGRAM(s) Oral daily  atorvastatin 40 milliGRAM(s) Oral at bedtime  enoxaparin Injectable 40 milliGRAM(s) SubCutaneous daily  nicotine - 21 mG/24Hr(s) Patch 1 patch Transdermal daily  polyethylene glycol 3350 17 Gram(s) Oral daily  senna 2 Tablet(s) Oral at bedtime    MEDICATIONS  (PRN):  bisacodyl Suppository 10 milliGRAM(s) Rectal daily PRN Constipation  guaiFENesin   Syrup  (Sugar-Free) 200 milliGRAM(s) Oral every 6 hours PRN Cough

## 2021-02-11 ENCOUNTER — TRANSCRIPTION ENCOUNTER (OUTPATIENT)
Age: 61
End: 2021-02-11

## 2021-02-11 LAB
ALBUMIN SERPL ELPH-MCNC: 3.3 G/DL — SIGNIFICANT CHANGE UP (ref 3.3–5)
ALP SERPL-CCNC: 85 U/L — SIGNIFICANT CHANGE UP (ref 40–120)
ALT FLD-CCNC: 32 U/L — SIGNIFICANT CHANGE UP (ref 10–45)
ANION GAP SERPL CALC-SCNC: 11 MMOL/L — SIGNIFICANT CHANGE UP (ref 5–17)
AST SERPL-CCNC: 22 U/L — SIGNIFICANT CHANGE UP (ref 10–40)
BASOPHILS # BLD AUTO: 0.16 K/UL — SIGNIFICANT CHANGE UP (ref 0–0.2)
BASOPHILS NFR BLD AUTO: 1.8 % — SIGNIFICANT CHANGE UP (ref 0–2)
BILIRUB SERPL-MCNC: 0.5 MG/DL — SIGNIFICANT CHANGE UP (ref 0.2–1.2)
BUN SERPL-MCNC: 16 MG/DL — SIGNIFICANT CHANGE UP (ref 7–23)
CALCIUM SERPL-MCNC: 8.9 MG/DL — SIGNIFICANT CHANGE UP (ref 8.4–10.5)
CHLORIDE SERPL-SCNC: 105 MMOL/L — SIGNIFICANT CHANGE UP (ref 96–108)
CO2 SERPL-SCNC: 26 MMOL/L — SIGNIFICANT CHANGE UP (ref 22–31)
CREAT SERPL-MCNC: 0.91 MG/DL — SIGNIFICANT CHANGE UP (ref 0.5–1.3)
EOSINOPHIL # BLD AUTO: 0.74 K/UL — HIGH (ref 0–0.5)
EOSINOPHIL NFR BLD AUTO: 8.1 % — HIGH (ref 0–6)
GLUCOSE SERPL-MCNC: 82 MG/DL — SIGNIFICANT CHANGE UP (ref 70–99)
HCT VFR BLD CALC: 47.3 % — SIGNIFICANT CHANGE UP (ref 39–50)
HGB BLD-MCNC: 15.8 G/DL — SIGNIFICANT CHANGE UP (ref 13–17)
IMM GRANULOCYTES NFR BLD AUTO: 0.3 % — SIGNIFICANT CHANGE UP (ref 0–1.5)
LYMPHOCYTES # BLD AUTO: 2.62 K/UL — SIGNIFICANT CHANGE UP (ref 1–3.3)
LYMPHOCYTES # BLD AUTO: 28.7 % — SIGNIFICANT CHANGE UP (ref 13–44)
MCHC RBC-ENTMCNC: 31.9 PG — SIGNIFICANT CHANGE UP (ref 27–34)
MCHC RBC-ENTMCNC: 33.4 GM/DL — SIGNIFICANT CHANGE UP (ref 32–36)
MCV RBC AUTO: 95.6 FL — SIGNIFICANT CHANGE UP (ref 80–100)
MONOCYTES # BLD AUTO: 0.73 K/UL — SIGNIFICANT CHANGE UP (ref 0–0.9)
MONOCYTES NFR BLD AUTO: 8 % — SIGNIFICANT CHANGE UP (ref 2–14)
NEUTROPHILS # BLD AUTO: 4.86 K/UL — SIGNIFICANT CHANGE UP (ref 1.8–7.4)
NEUTROPHILS NFR BLD AUTO: 53.1 % — SIGNIFICANT CHANGE UP (ref 43–77)
NRBC # BLD: 0 /100 WBCS — SIGNIFICANT CHANGE UP (ref 0–0)
PLATELET # BLD AUTO: 227 K/UL — SIGNIFICANT CHANGE UP (ref 150–400)
POTASSIUM SERPL-MCNC: 3.7 MMOL/L — SIGNIFICANT CHANGE UP (ref 3.5–5.3)
POTASSIUM SERPL-SCNC: 3.7 MMOL/L — SIGNIFICANT CHANGE UP (ref 3.5–5.3)
PROT SERPL-MCNC: 7.7 G/DL — SIGNIFICANT CHANGE UP (ref 6–8.3)
RBC # BLD: 4.95 M/UL — SIGNIFICANT CHANGE UP (ref 4.2–5.8)
RBC # FLD: 13.2 % — SIGNIFICANT CHANGE UP (ref 10.3–14.5)
SARS-COV-2 RNA SPEC QL NAA+PROBE: SIGNIFICANT CHANGE UP
SODIUM SERPL-SCNC: 142 MMOL/L — SIGNIFICANT CHANGE UP (ref 135–145)
WBC # BLD: 9.14 K/UL — SIGNIFICANT CHANGE UP (ref 3.8–10.5)
WBC # FLD AUTO: 9.14 K/UL — SIGNIFICANT CHANGE UP (ref 3.8–10.5)

## 2021-02-11 PROCEDURE — 99232 SBSQ HOSP IP/OBS MODERATE 35: CPT

## 2021-02-11 PROCEDURE — 99232 SBSQ HOSP IP/OBS MODERATE 35: CPT | Mod: GC

## 2021-02-11 RX ORDER — ATORVASTATIN CALCIUM 80 MG/1
1 TABLET, FILM COATED ORAL
Qty: 30 | Refills: 0
Start: 2021-02-11 | End: 2021-03-12

## 2021-02-11 RX ORDER — SENNA PLUS 8.6 MG/1
2 TABLET ORAL
Qty: 0 | Refills: 0 | DISCHARGE
Start: 2021-02-11

## 2021-02-11 RX ORDER — NICOTINE POLACRILEX 2 MG
1 GUM BUCCAL
Qty: 30 | Refills: 0
Start: 2021-02-11 | End: 2021-03-12

## 2021-02-11 RX ORDER — ASPIRIN/CALCIUM CARB/MAGNESIUM 324 MG
1 TABLET ORAL
Qty: 30 | Refills: 0
Start: 2021-02-11 | End: 2021-03-12

## 2021-02-11 RX ORDER — POLYETHYLENE GLYCOL 3350 17 G/17G
17 POWDER, FOR SOLUTION ORAL
Qty: 0 | Refills: 0 | DISCHARGE
Start: 2021-02-11

## 2021-02-11 RX ADMIN — Medication 1 PATCH: at 04:20

## 2021-02-11 RX ADMIN — POLYETHYLENE GLYCOL 3350 17 GRAM(S): 17 POWDER, FOR SOLUTION ORAL at 14:23

## 2021-02-11 RX ADMIN — Medication 81 MILLIGRAM(S): at 14:23

## 2021-02-11 RX ADMIN — ENOXAPARIN SODIUM 40 MILLIGRAM(S): 100 INJECTION SUBCUTANEOUS at 14:25

## 2021-02-11 RX ADMIN — ATORVASTATIN CALCIUM 40 MILLIGRAM(S): 80 TABLET, FILM COATED ORAL at 20:26

## 2021-02-11 RX ADMIN — Medication 1 PATCH: at 20:26

## 2021-02-11 RX ADMIN — Medication 5 MILLIGRAM(S): at 20:26

## 2021-02-11 RX ADMIN — SENNA PLUS 2 TABLET(S): 8.6 TABLET ORAL at 20:26

## 2021-02-11 RX ADMIN — Medication 1 PATCH: at 14:25

## 2021-02-11 RX ADMIN — Medication 1 APPLICATION(S): at 14:24

## 2021-02-11 RX ADMIN — Medication 1 PATCH: at 14:23

## 2021-02-11 NOTE — PROGRESS NOTE ADULT - ATTENDING COMMENTS
Seen and examined. Chart reviewed.   Agree with above a/p  Edited where ever is necessary    clinically improving. stable. adjust laxatives for BM 2-3 per day

## 2021-02-11 NOTE — DISCHARGE NOTE PROVIDER - PROVIDER TOKENS
PROVIDER:[TOKEN:[[id number]],FOLLOWUP:[1 month]],PROVIDER:[TOKEN:[[id number]],FOLLOWUP:[Routine]],PROVIDER:[TOKEN:[[id number]],FOLLOWUP:[1 month]],FREE:[LAST:[Your PCP],PHONE:[(   )    -],FAX:[(   )    -],FOLLOWUP:[1 week]]

## 2021-02-11 NOTE — DISCHARGE NOTE PROVIDER - NSDCMRMEDTOKEN_GEN_ALL_CORE_FT
polyethylene glycol 3350 oral powder for reconstitution: 17 gram(s) orally once a day, As Needed  senna oral tablet: 2 tab(s) orally once a day (at bedtime), As Needed   aspirin 81 mg oral delayed release tablet: 1 tab(s) orally once a day  atorvastatin 40 mg oral tablet: 1 tab(s) orally once a day (at bedtime)  Nicoderm C-Q Clear 14 mg/24 hr transdermal film, extended release: 1 patch transdermally once a day  from 2/20/21 for 2 weeks and then reduce  as per Primary Care Physician   Nicoderm C-Q Clear 21 mg/24 hr transdermal film, extended release: 1 patch transdermally once a day   polyethylene glycol 3350 oral powder for reconstitution: 17 gram(s) orally once a day, As Needed for constipation   senna oral tablet: 2 tab(s) orally once a day (at bedtime), As Needed

## 2021-02-11 NOTE — PROGRESS NOTE ADULT - ASSESSMENT
59 y/o M with PMHx chronic tobacco use presented to [facility] 1/29/21 for stroke symptoms that began the day before in [place]. He signed out of ED, flew to [place], and went to [facility]. MRI showed R MCA stroke. Patient admitted to [facility] for rehab.     #R MCA CVA with Left sided weakness  #Moderate left ICA stenosis  -Continue comprehensive rehab program -PT/OT/SLP per rehab team  -Pain management, bowel regimen per rehab   -Continue ASA and Lipitor  -seen by vascular 2/9. no intervention needed. risk factor management. surveillance carotid doppler Q 6 months  -F/u vascular, outpatient   -F/u neuro, Dr. Mota, outpatient  -Aspiration, fall, safety, seizure precautions    #Intraparotid Nodules  -F/u with PCP outpatient    # leucocytosis- resolved    #Tobacco use  -Nicotine patch  -counseled    # hypoalbuminemia-resolved  # obesity  - weight loss  - protien rich diet  - nutrition eval ongoing    #DVT ppx - lovenox      DC planned for Saturday 61 y/o M with PMHx chronic tobacco use presented to [facility] 1/29/21 for stroke symptoms that began the day before in [place]. He signed out of ED, flew to [place], and went to [facility]. MRI showed R MCA stroke. Patient admitted to [facility] for rehab.     #R MCA CVA with Left sided weakness  #Moderate left ICA stenosis  -Continue comprehensive rehab program -PT/OT/SLP per rehab team  -Pain management, bowel regimen per rehab   -Continue ASA and Lipitor  -seen by vascular 2/9. no intervention needed. risk factor management. surveillance carotid doppler Q 6 months  -F/u vascular, outpatient   -F/u neuro, Dr. Mota, outpatient  -Aspiration, fall, safety, seizure precautions    #Intraparotid Nodules  -F/u with PCP outpatient    # leucocytosis- resolved    #Tobacco use  -Nicotine patch  -counseled    # hypoalbuminemia-resolved  # obesity  - weight loss  - protien rich diet  - nutrition eval ongoing    # constipation  - added dulcolax by primary team  - c/w other meds    #DVT ppx - lovenox      DC planned for Saturday

## 2021-02-11 NOTE — DISCHARGE NOTE PROVIDER - HOSPITAL COURSE
59yo male with no pertinent PMH presented with stroke to [facility] 1/29/21. Per daughter, patient had symptoms starting 1/28/21 at 10am in the [place]. He was seen in ER, had CT and covid test, then he was signed out and took a flight to [place]. Patient got off plane and presented to [facility] ED. Pt with left arm weakness and dysarthria, but denies headache, cp, sob, dizziness, NV. Patient was found to have MRI findings of acute and/or recent subacute R MCA stroke and a parotid lesion. Patient was started on ASA and statin.    Patient was medically stable for discharge to [place] for multidisciplinary acute rehab 2/3/21.    Pt was [facility] upon [facility]. Admitted with gait instability, ADL, and functional impairments.     Rehab Course significant for vascular surgery consult for carotid US showing left ICA stenosis 50-69%. Patient recommended to follow up in 6 months with carotid doppler for monitoring.    All other medical co-morbidities were stable.     Pt did well during course of inpatient PT/OT/SLP rehab with significant functional improvements and met rehab goals prior to discharge.    Pt was medically cleared on 2/13/21 for discharge to home with [facility] services.

## 2021-02-11 NOTE — DISCHARGE NOTE PROVIDER - NSDCCPCAREPLAN_GEN_ALL_CORE_FT
PRINCIPAL DISCHARGE DIAGNOSIS  Diagnosis: Cerebrovascular accident (CVA)  Assessment and Plan of Treatment: Continue to take aspirin 81 mg and Lipitor 40mg daily as prescribed. Follow up with your primary care physician in 1 week, and physiatrist Dr. Jaramillo within 1 month. You will receive therapy services at home after discharge.      SECONDARY DISCHARGE DIAGNOSES  Diagnosis: Carotid stenosis, left  Assessment and Plan of Treatment: You will need a carotid ultrasound in 6 months to monitor. Please follow up with Dr. France.    Diagnosis: Tobacco dependence  Assessment and Plan of Treatment: Smoking is a risk factor for stroke. Continue to use nicotene patches to manage your nicotene dependence and follow up with your PCP.

## 2021-02-11 NOTE — DISCHARGE NOTE PROVIDER - NSDCFUADDINST_GEN_ALL_CORE_FT
It is important to see your primary physician as well as the physicians noted below within the next week to perform a comprehensive medical review.  Call their offices for an appointment as soon as you leave the hospital.  If you do not have a primary physician, contact the [facility] Physician Referral Service at [phone].  To obtain your results, you can access the [facility] Patient Portal at [email or web address].  Your medical issues appear to be stable at this time, but if your symptoms recur or worsen, contact your physicians and/or return to the hospital if necessary.  If you encounter any issues or questions with your medication, call your physicians before stopping the medication.  Do not drive.  Limit your diet to 2 grams of sodium daily.

## 2021-02-11 NOTE — PROGRESS NOTE ADULT - ASSESSMENT
Assessment/Plan:  60y with no significant PMHx presented to [facility] 1/29/21 for stroke symptoms that began the day before in [place]. He signed out of ED, flew to [place], and went to [facility]. MRI showed R MCA stroke.       R MCA CVA with Left sided weakness  - Continue ASA and Lipitor  - F/u neuro, Dr. Mota, outpatient  - [facility] comprehensive rehab program, PT/OT/SLP 3 hours a day, 5 days a week.  - Precautions: falls, aspiration    Left Carotid Artery stenosis  - BL Carotid Doppler US: 50-69% L ICA stenosis  - vascular surgery consult 2/9 appreciated: pt to follow up as outpatient with surveillance carotid dopplers Q6 months to monitor progresion    Smoking:- continue nicotine patch and wean off as outpatient. Smoking cessation reiterated with patient     Intraparotid Nodules:- F/u with PCP outpatient    GI/Bowel:- senna qhs, miralax, PO dulcolax at qhs added 2/11   - dulcolax supp PRN    /Bladder:   - Continent-- voiding well    Skin/Pressure Injury:     Diet/Dysphagia:now on regular with thin   -  DVT ppx:- Lovenox      Outpatient Follow-up (Specialty/Name of physician):  Alfredo Mota  NEUROLOGY  [address] [place] [address] [id number]  Phone: [phone]  Fax: [phone]  ---  IDT meeting 2/9:  SW: Pt lives in private home with daughter and spouse. 5 MARLENE and flight to bedroom. Independent prior to admission. Working.  OT: Setup for eating and grooming. CG for bathroom transfers. Impaired coordination, decreased balance, left inattention.  PT: Close supervision for transfers. Walks 150 ft with RW and close supervision. 12 steps with 1 HR and CG. Goals: mod-I for transfers and gait  SLP: mod I for communication. Decreased memory and recall  ELOD: 2/13. Dc with home care ( family preference)   ---

## 2021-02-11 NOTE — DISCHARGE NOTE PROVIDER - CARE PROVIDERS DIRECT ADDRESSES
,DirectAddress_Unknown,DirectAddress_Unknown,[email or web address],DirectAddress_Unknown

## 2021-02-11 NOTE — DISCHARGE NOTE PROVIDER - NSDCCAREPROVSEEN_GEN_ALL_CORE_FT
Bong, Francisca Jaramillo, Joceline Romeo, Grace Miles, Sarah Gavin, Thelma France, Truong HER Bong, Francisca Jaramillo, Joceline Romeo, Grace Gavin, Thelma France, Truong HER

## 2021-02-11 NOTE — PROGRESS NOTE ADULT - ATTENDING COMMENTS
Chart reviewed. Patient seen at bedside  Denies any issues overnight, except no BM for 3 days. Denies any abdominal pain or nausea.   Dulcolax tonight    2. Labs stable.    3. Continue rehab program- d/c planning home on Saturday

## 2021-02-11 NOTE — PROGRESS NOTE ADULT - SUBJECTIVE AND OBJECTIVE BOX
Medicine Progress Note    Patient is a 60y old  Male who presents with a chief complaint of R MCA stroke  01.1 Left Body Involvement (Right Brain) (10 Feb 2021 11:58)      SUBJECTIVE / OVERNIGHT EVENTS: pt seen and evaluated at bedside. no overnight events. pt is OOB in wheelchair    MEDICATIONS  (STANDING):  AQUAPHOR (petrolatum Ointment) 1 Application(s) Topical daily  aspirin enteric coated 81 milliGRAM(s) Oral daily  atorvastatin 40 milliGRAM(s) Oral at bedtime  enoxaparin Injectable 40 milliGRAM(s) SubCutaneous daily  nicotine - 21 mG/24Hr(s) Patch 1 patch Transdermal daily  polyethylene glycol 3350 17 Gram(s) Oral daily  senna 2 Tablet(s) Oral at bedtime    MEDICATIONS  (PRN):  bisacodyl Suppository 10 milliGRAM(s) Rectal daily PRN Constipation  guaiFENesin   Syrup  (Sugar-Free) 200 milliGRAM(s) Oral every 6 hours PRN Cough    CAPILLARY BLOOD GLUCOSE        I&O's Summary    10 Feb 2021 07:01  -  11 Feb 2021 07:00  --------------------------------------------------------  IN: 400 mL / OUT: 1100 mL / NET: -700 mL        PHYSICAL EXAM:  Vital Signs Last 24 Hrs  T(C): 36.3 (11 Feb 2021 09:27), Max: 37 (11 Feb 2021 01:14)  T(F): 97.4 (11 Feb 2021 09:27), Max: 98.6 (11 Feb 2021 01:14)  HR: 96 (11 Feb 2021 09:27) (81 - 96)  BP: 120/86 (11 Feb 2021 09:27) (115/75 - 121/85)  BP(mean): --  RR: 18 (11 Feb 2021 09:27) (18 - 18)  SpO2: 97% (11 Feb 2021 09:27) (96% - 97%)      CONSTITUTIONAL: NAD  ENMT: Moist oral mucosa,   RESPIRATORY: Normal respiratory effort; lungs are clear to auscultation bilaterally  CARDIOVASCULAR: Regular rate and rhythm, normal S1 and S2, no murmur/rub/gallop; No lower extremity edema  ABDOMEN: Nontender to palpation, no rebound/guarding  PSYCH: A+O to person, place, and time; affect appropriate  EXT: no calf tenderness, no pitting edema b/l. +LUE, LLE weakness      LABS:                        15.8   9.14  )-----------( 227      ( 11 Feb 2021 06:33 )             47.3     02-11    142  |  105  |  16  ----------------------------<  82  3.7   |  26  |  0.91    Ca    8.9      11 Feb 2021 06:33    TPro  7.7  /  Alb  3.3  /  TBili  0.5  /  DBili  x   /  AST  22  /  ALT  32  /  AlkPhos  85  02-11              COVID-19 PCR: NotDetec (10 Feb 2021 17:30)  COVID-19 PCR: NotDetec (03 Feb 2021 16:45)      RADIOLOGY & ADDITIONAL TESTS:  Imaging from Last 24 Hours:    Electrocardiogram/QTc Interval:    COORDINATION OF CARE:  Care Discussed with Consultants/Other Providers:   Medicine Progress Note    Patient is a 60y old  Male who presents with a chief complaint of R MCA stroke  01.1 Left Body Involvement (Right Brain) (10 Feb 2021 11:58)      SUBJECTIVE / OVERNIGHT EVENTS: pt seen and evaluated at bedside. no overnight events. pt is OOB in wheelchair. denied complaints      ROS:  + constipaiton. no fever/chills/CP/sob/palpitation/dizziness/ abd pain/nausea/vomiting/diarrhea/headaches. all other ROS neg    MEDICATIONS  (STANDING):  AQUAPHOR (petrolatum Ointment) 1 Application(s) Topical daily  aspirin enteric coated 81 milliGRAM(s) Oral daily  atorvastatin 40 milliGRAM(s) Oral at bedtime  enoxaparin Injectable 40 milliGRAM(s) SubCutaneous daily  nicotine - 21 mG/24Hr(s) Patch 1 patch Transdermal daily  polyethylene glycol 3350 17 Gram(s) Oral daily  senna 2 Tablet(s) Oral at bedtime    MEDICATIONS  (PRN):  bisacodyl Suppository 10 milliGRAM(s) Rectal daily PRN Constipation  guaiFENesin   Syrup  (Sugar-Free) 200 milliGRAM(s) Oral every 6 hours PRN Cough    CAPILLARY BLOOD GLUCOSE        I&O's Summary    10 Feb 2021 07:01  -  11 Feb 2021 07:00  --------------------------------------------------------  IN: 400 mL / OUT: 1100 mL / NET: -700 mL        PHYSICAL EXAM:  Vital Signs Last 24 Hrs  T(C): 36.3 (11 Feb 2021 09:27), Max: 37 (11 Feb 2021 01:14)  T(F): 97.4 (11 Feb 2021 09:27), Max: 98.6 (11 Feb 2021 01:14)  HR: 96 (11 Feb 2021 09:27) (81 - 96)  BP: 120/86 (11 Feb 2021 09:27) (115/75 - 121/85)  BP(mean): --  RR: 18 (11 Feb 2021 09:27) (18 - 18)  SpO2: 97% (11 Feb 2021 09:27) (96% - 97%)      CONSTITUTIONAL: NAD  ENMT: Moist oral mucosa,   RESPIRATORY: Normal respiratory effort; lungs are clear to auscultation bilaterally  CARDIOVASCULAR: Regular rate and rhythm, normal S1 and S2, no murmur/rub/gallop; No lower extremity edema  ABDOMEN: Nontender to palpation, no rebound/guarding  PSYCH: A+O to person, place, and time; affect appropriate  EXT: no calf tenderness, no pitting edema b/l. +LUE, LLE weakness      LABS:                        15.8   9.14  )-----------( 227      ( 11 Feb 2021 06:33 )             47.3     02-11    142  |  105  |  16  ----------------------------<  82  3.7   |  26  |  0.91    Ca    8.9      11 Feb 2021 06:33    TPro  7.7  /  Alb  3.3  /  TBili  0.5  /  DBili  x   /  AST  22  /  ALT  32  /  AlkPhos  85  02-11              COVID-19 PCR: NotDetec (10 Feb 2021 17:30)  COVID-19 PCR: NotDetec (03 Feb 2021 16:45)      RADIOLOGY & ADDITIONAL TESTS:  Imaging from Last 24 Hours:    Electrocardiogram/QTc Interval:    COORDINATION OF CARE:  Care Discussed with Consultants/Other Providers:

## 2021-02-11 NOTE — DISCHARGE NOTE PROVIDER - CARE PROVIDER_API CALL
Alfredo Mota  NEUROLOGY  [address], [place] [id number]  Phone: [phone]  Fax: [phone]  Follow Up Time: 1 month    Truong France)  Surgery  [address] [facility], [address]  [place], [place] [id number]  Phone: [phone]  Fax: [phone]  Follow Up Time: Routine    Joceline Jaramillo ()  Brain Injury Medicine; PhysicalRehab Medicine  [address] [place] [address]  Phone: [phone]  Fax: [phone]  Follow Up Time: 1 month    Your PCP,   Phone: (   )    -  Fax: (   )    -  Follow Up Time: 1 week

## 2021-02-11 NOTE — PROGRESS NOTE ADULT - SUBJECTIVE AND OBJECTIVE BOX
Patient is a 60y old  Male who presents with a chief complaint of R MCA stroke  01.1 Left Body Involvement (Right Brain) (2021 09:53)      HPI:  61yo male with no pertinent PMH presented with stroke to [facility] 21. Per daughter, patient had symptoms starting 21 at 10am in the [facility]. He was seen in ER, had CT and covid test, then he was signed out and took a flight to [place]. Patient got off plane and presented to [facility] ED. Pt with left arm weakness and dysarthria, but denies headache, cp, sob, dizziness, NV. Patient was found to have MRI findings of acute and/or recent subacute R MCA stroke and a parotid lesion. Patient was started on ASA and statin.     Patient was medically stable for discharge to [place] for multidisciplinary acute rehab 2/3/21.     (2021 14:19)        SUBJECTIVE: Patient seen and examined while OOB in wheelchair.  No acute overnight events, slept well.  No bowel movement in 3 days. Denies N/V, abdominal pain.  Reports itching over the left side of his neck.  No other complaints.       REVIEW OF SYSTEMS  Constitutional: No fever, No fatigue  HEENT: No eye pain  Pulm: No cough,  No shortness of breath  Cardio: No chest pain  GI:  No abdominal pain, +constipation  last BM   Neuro: No headaches   MSK: No joint pain  Psych:  No depression, No anxiety      VITALS  60y  Vital Signs Last 24 Hrs  T(C): 36.3 (2021 09:27), Max: 37 (2021 01:14)  T(F): 97.4 (2021 09:27), Max: 98.6 (2021 01:14)  HR: 96 (2021 09:27) (81 - 96)  BP: 120/86 (2021 09:27) (115/75 - 121/85)  BP(mean): --  RR: 18 (2021 09:27) (18 - 18)  SpO2: 97% (2021 09:27) (96% - 97%)  Daily     Daily Weight in k.3 (2021 05:20)      PHYSICAL EXAM  Gen - NAD, Comfortable  pulm - CTAB  Cardiovascular -S1S2, no murmur  Abdomen - Soft, NT/ND, +BS  Extremities - No C/C/E, No calf tenderness  Neuro-     Cognitive - AAOx4     Communication - Fluent, +dysarthria         Mild left facial droop     Motor -                    LEFT    UE - ShAB 3/5, EF 3/5, EE 3/5, WE 2/5,  2/5                    RIGHT UE - ShAB 5/5, EF 5/5, EE 5/5, WE 5/5,  5/5                    LEFT    LE - HF 4/5, KE 5/5, DF 5/5, PF 5/5                    RIGHT LE - HF 5/5, KE 5/5, DF 5/5, PF 5/5           Coordination - L FTN dysmetria  Psychiatric - Mood stable, Affect WNL      Functional status - close supervision for transfers, CG for gait      RECENT LABS:                        15.8   9.14  )-----------( 227      ( 2021 06:33 )             47.3     02-11    142  |  105  |  16  ----------------------------<  82  3.7   |  26  |  0.91    Ca    8.9      2021 06:33    TPro  7.7  /  Alb  3.3  /  TBili  0.5  /  DBili  x   /  AST  22  /  ALT  32  /  AlkPhos  85  02-11    LIVER FUNCTIONS - ( 2021 06:33 )  Alb: 3.3 g/dL / Pro: 7.7 g/dL / ALK PHOS: 85 U/L / ALT: 32 U/L / AST: 22 U/L / GGT: x           CAPILLARY BLOOD GLUCOSE        MEDICATIONS:  MEDICATIONS  (STANDING):  AQUAPHOR (petrolatum Ointment) 1 Application(s) Topical daily  aspirin enteric coated 81 milliGRAM(s) Oral daily  atorvastatin 40 milliGRAM(s) Oral at bedtime  enoxaparin Injectable 40 milliGRAM(s) SubCutaneous daily  nicotine - 21 mG/24Hr(s) Patch 1 patch Transdermal daily  polyethylene glycol 3350 17 Gram(s) Oral daily  senna 2 Tablet(s) Oral at bedtime    MEDICATIONS  (PRN):  bisacodyl Suppository 10 milliGRAM(s) Rectal daily PRN Constipation  guaiFENesin   Syrup  (Sugar-Free) 200 milliGRAM(s) Oral every 6 hours PRN Cough

## 2021-02-12 ENCOUNTER — TRANSCRIPTION ENCOUNTER (OUTPATIENT)
Age: 61
End: 2021-02-12

## 2021-02-12 PROCEDURE — 92507 TX SP LANG VOICE COMM INDIV: CPT

## 2021-02-12 PROCEDURE — 97112 NEUROMUSCULAR REEDUCATION: CPT

## 2021-02-12 PROCEDURE — 99232 SBSQ HOSP IP/OBS MODERATE 35: CPT | Mod: GC

## 2021-02-12 PROCEDURE — 36415 COLL VENOUS BLD VENIPUNCTURE: CPT

## 2021-02-12 PROCEDURE — 97530 THERAPEUTIC ACTIVITIES: CPT

## 2021-02-12 PROCEDURE — 92523 SPEECH SOUND LANG COMPREHEN: CPT

## 2021-02-12 PROCEDURE — 80053 COMPREHEN METABOLIC PANEL: CPT

## 2021-02-12 PROCEDURE — 97110 THERAPEUTIC EXERCISES: CPT

## 2021-02-12 PROCEDURE — 97535 SELF CARE MNGMENT TRAINING: CPT

## 2021-02-12 PROCEDURE — U0003: CPT

## 2021-02-12 PROCEDURE — 85027 COMPLETE CBC AUTOMATED: CPT

## 2021-02-12 PROCEDURE — 93880 EXTRACRANIAL BILAT STUDY: CPT

## 2021-02-12 PROCEDURE — 85025 COMPLETE CBC W/AUTO DIFF WBC: CPT

## 2021-02-12 PROCEDURE — 99232 SBSQ HOSP IP/OBS MODERATE 35: CPT

## 2021-02-12 PROCEDURE — 97163 PT EVAL HIGH COMPLEX 45 MIN: CPT

## 2021-02-12 PROCEDURE — 97116 GAIT TRAINING THERAPY: CPT

## 2021-02-12 PROCEDURE — 92610 EVALUATE SWALLOWING FUNCTION: CPT

## 2021-02-12 PROCEDURE — 97167 OT EVAL HIGH COMPLEX 60 MIN: CPT

## 2021-02-12 PROCEDURE — U0005: CPT

## 2021-02-12 RX ORDER — LACTULOSE 10 G/15ML
20 SOLUTION ORAL ONCE
Refills: 0 | Status: COMPLETED | OUTPATIENT
Start: 2021-02-12 | End: 2021-02-12

## 2021-02-12 RX ORDER — ATORVASTATIN CALCIUM 80 MG/1
1 TABLET, FILM COATED ORAL
Qty: 30 | Refills: 0
Start: 2021-02-12 | End: 2021-03-14

## 2021-02-12 RX ORDER — POLYETHYLENE GLYCOL 3350 17 G/17G
17 POWDER, FOR SOLUTION ORAL
Refills: 0 | Status: DISCONTINUED | OUTPATIENT
Start: 2021-02-12 | End: 2021-02-13

## 2021-02-12 RX ORDER — ASPIRIN/CALCIUM CARB/MAGNESIUM 324 MG
1 TABLET ORAL
Qty: 30 | Refills: 0
Start: 2021-02-12 | End: 2021-03-13

## 2021-02-12 RX ORDER — MAGNESIUM HYDROXIDE 400 MG/1
30 TABLET, CHEWABLE ORAL ONCE
Refills: 0 | Status: DISCONTINUED | OUTPATIENT
Start: 2021-02-12 | End: 2021-02-13

## 2021-02-12 RX ORDER — NICOTINE POLACRILEX 2 MG
1 GUM BUCCAL
Qty: 1 | Refills: 0
Start: 2021-02-12

## 2021-02-12 RX ORDER — NICOTINE POLACRILEX 2 MG
1 GUM BUCCAL
Qty: 7 | Refills: 0
Start: 2021-02-12 | End: 2021-02-18

## 2021-02-12 RX ORDER — POLYETHYLENE GLYCOL 3350 17 G/17G
17 POWDER, FOR SOLUTION ORAL
Qty: 17 | Refills: 0
Start: 2021-02-12

## 2021-02-12 RX ORDER — ATORVASTATIN CALCIUM 80 MG/1
1 TABLET, FILM COATED ORAL
Qty: 30 | Refills: 0
Start: 2021-02-12 | End: 2021-03-13

## 2021-02-12 RX ADMIN — Medication 1 PATCH: at 07:31

## 2021-02-12 RX ADMIN — Medication 1 PATCH: at 17:12

## 2021-02-12 RX ADMIN — Medication 1 APPLICATION(S): at 11:34

## 2021-02-12 RX ADMIN — Medication 5 MILLIGRAM(S): at 23:16

## 2021-02-12 RX ADMIN — Medication 1 PATCH: at 11:33

## 2021-02-12 RX ADMIN — LACTULOSE 20 GRAM(S): 10 SOLUTION ORAL at 11:33

## 2021-02-12 RX ADMIN — ENOXAPARIN SODIUM 40 MILLIGRAM(S): 100 INJECTION SUBCUTANEOUS at 11:33

## 2021-02-12 RX ADMIN — ATORVASTATIN CALCIUM 40 MILLIGRAM(S): 80 TABLET, FILM COATED ORAL at 23:36

## 2021-02-12 RX ADMIN — SENNA PLUS 2 TABLET(S): 8.6 TABLET ORAL at 23:16

## 2021-02-12 RX ADMIN — Medication 81 MILLIGRAM(S): at 11:33

## 2021-02-12 RX ADMIN — Medication 1 PATCH: at 13:16

## 2021-02-12 NOTE — DISCHARGE NOTE NURSING/CASE MANAGEMENT/SOCIAL WORK - NSDCPEWEB_GEN_ALL_CORE
[facility] for Tobacco Control website --- [email or web address] website --- [email or web address]

## 2021-02-12 NOTE — DISCHARGE NOTE NURSING/CASE MANAGEMENT/SOCIAL WORK - PATIENT PORTAL LINK FT
You can access the FollowMyHealth Patient Portal offered by [facility] by registering at the following website: [email or web address]. By joining [email or web address]’s FollowMyHealth portal, you will also be able to view your health information using other applications (apps) compatible with our system.

## 2021-02-12 NOTE — PROGRESS NOTE ADULT - SUBJECTIVE AND OBJECTIVE BOX
Patient is a 60y old  Male who presents with a chief complaint of R MCA stroke  01.1 Left Body Involvement (Right Brain) (12 Feb 2021 10:55)      HPI:  61yo male with no pertinent PMH presented with stroke to [facility] 1/29/21. Per daughter, patient had symptoms starting 1/28/21 at 10am in the [facility]. He was seen in ER, had CT and covid test, then he was signed out and took a flight to [place]. Patient got off plane and presented to [facility] ED. Pt with left arm weakness and dysarthria, but denies headache, cp, sob, dizziness, NV. Patient was found to have MRI findings of acute and/or recent subacute R MCA stroke and a parotid lesion. Patient was started on ASA and statin.     Patient was medically stable for discharge to [facility] for multidisciplinary acute rehab 2/3/21.   (03 Feb 2021 14:19)      SUBJECTIVE: Patient seen and examined in his room. Patient reports no bowel movement in four days. Agreeable to trying lactulose.  Denies abdominal pain, cramping, nausea, emesis.  Slept well, good appetite.  Itching is better.  No other complaints.    REVIEW OF SYSTEMS  Constitutional: No fever, No fatigue  HEENT: No eye pain  Pulm: No cough,  No shortness of breath  Cardio: No chest pain  GI:  No abdominal pain, +constipation  last BM 2/8  Neuro: No headaches   MSK: No joint pain  Psych:  No depression, No anxiety    VITALS  60y  Vital Signs Last 24 Hrs  T(C): 36.4 (12 Feb 2021 07:46), Max: 36.6 (11 Feb 2021 19:42)  T(F): 97.6 (12 Feb 2021 07:46), Max: 97.9 (11 Feb 2021 19:42)  HR: 80 (12 Feb 2021 07:46) (64 - 80)  BP: 121/86 (12 Feb 2021 07:46) (111/63 - 121/86)  BP(mean): --  RR: 16 (12 Feb 2021 07:46) (16 - 16)  SpO2: 98% (12 Feb 2021 07:46) (96% - 98%)  Daily     Daily       PHYSICAL EXAM  Gen - NAD, Comfortable  pulm - No respiratory distress, breathing comfortably on room air  Cardiovascular - pulse regular, warm and well perfused  Abdomen - Soft, NT/ND, +BS  Extremities - No C/C/E, No calf tenderness  Neuro-     Cognitive - AAOx4     Communication - Fluent, +dysarthria         Mild left facial droop     Motor -                    LEFT    UE - ShAB 3/5, EF 3/5, EE 3/5, WE 2/5,  2/5                    RIGHT UE - ShAB 5/5, EF 5/5, EE 5/5, WE 5/5,  5/5                    LEFT    LE - HF 4/5, KE 5/5, DF 5/5, PF 5/5                    RIGHT LE - HF 5/5, KE 5/5, DF 5/5, PF 5/5           Coordination - L FTN dysmetria  Psychiatric - Mood stable, Affect WNL          RECENT LABS:                        15.8   9.14  )-----------( 227      ( 11 Feb 2021 06:33 )             47.3     02-11    142  |  105  |  16  ----------------------------<  82  3.7   |  26  |  0.91    Ca    8.9      11 Feb 2021 06:33    TPro  7.7  /  Alb  3.3  /  TBili  0.5  /  DBili  x   /  AST  22  /  ALT  32  /  AlkPhos  85  02-11    LIVER FUNCTIONS - ( 11 Feb 2021 06:33 )  Alb: 3.3 g/dL / Pro: 7.7 g/dL / ALK PHOS: 85 U/L / ALT: 32 U/L / AST: 22 U/L / GGT: x                   CAPILLARY BLOOD GLUCOSE      MEDICATIONS  (STANDING):  AQUAPHOR (petrolatum Ointment) 1 Application(s) Topical daily  aspirin enteric coated 81 milliGRAM(s) Oral daily  atorvastatin 40 milliGRAM(s) Oral at bedtime  bisacodyl 5 milliGRAM(s) Oral at bedtime  enoxaparin Injectable 40 milliGRAM(s) SubCutaneous daily  lactulose Syrup 20 Gram(s) Oral once  nicotine - 21 mG/24Hr(s) Patch 1 patch Transdermal daily  polyethylene glycol 3350 17 Gram(s) Oral two times a day  senna 2 Tablet(s) Oral at bedtime    MEDICATIONS  (PRN):  bisacodyl Suppository 10 milliGRAM(s) Rectal daily PRN Constipation  guaiFENesin   Syrup  (Sugar-Free) 200 milliGRAM(s) Oral every 6 hours PRN Cough

## 2021-02-12 NOTE — DISCHARGE NOTE NURSING/CASE MANAGEMENT/SOCIAL WORK - NSDCFUADDAPPT_GEN_ALL_CORE_FT
The following was scheduled:    Doctor: Juan Manuel Huddleston MD at the [facility]  Appointment Date: 2/17/21  Appointment Time: 2pm  Phone: [phone]  Address:[address] [place] [address]

## 2021-02-12 NOTE — PROGRESS NOTE ADULT - ASSESSMENT
59 y/o M with PMHx chronic tobacco use presented to [facility] 1/29/21 for stroke symptoms that began the day before in [place]. He signed out of ED, flew to [place], and went to [facility]. MRI showed R MCA stroke. Patient admitted to [facility] for [facility]hab.     #R MCA CVA with Left sided weakness  #Moderate left ICA stenosis  -Continue comprehensive rehab program -PT/OT/SLP per rehab team  -Pain management, bowel regimen per rehab   -Continue ASA and Lipitor  -seen by vascular 2/9. no intervention needed. risk factor management. surveillance carotid doppler Q 6 months  -F/u vascular, outpatient   -F/u neuro, Dr. Mota, outpatient  -Aspiration, fall, safety, seizure precautions    #Intraparotid Nodules  -F/u with PCP outpatient    # leucocytosis- resolved    #Tobacco use  -Nicotine patch  -counseled    # hypoalbuminemia-resolved  # obesity  - weight loss  - protien rich diet  - nutrition eval ongoing    # constipation  - added dulcolax 5 mg at HS  by primary team 2/11  - increased miralax to BID  - Use suppository/Enema PRN. give one now  - c/w other meds    #DVT ppx - lovenox      DC planned for Saturday

## 2021-02-12 NOTE — PROGRESS NOTE ADULT - ASSESSMENT
Assessment/Plan:  60y with no significant PMHx presented to [facility] 1/29/21 for stroke symptoms that began the day before in [place]. He signed out of ED, flew to [place], and went to [facility]. MRI showed R MCA stroke.       R MCA CVA with Left sided weakness  - Continue ASA and Lipitor  - F/u neuro, Dr. Mota, outpatient  - [facility] comprehensive rehab program, PT/OT/SLP 3 hours a day, 5 days a week.  - Precautions: falls, aspiration    Left Carotid Artery stenosis  - BL Carotid Doppler US: 50-69% L ICA stenosis  - vascular surgery consult 2/9 appreciated: pt to follow up as outpatient with surveillance carotid dopplers Q6 months to monitor progresion    Smoking:- continue nicotine patch and wean off as outpatient. Smoking cessation reiterated with patient     Intraparotid Nodules:- F/u with PCP outpatient    GI/Bowel:- senna qhs, miralax, PO dulcolax at qhs added 2/11 , lactulose x 1    /Bladder:  Continent-- voiding well    Diet/Dysphagia: regular with thin     DVT ppx: Lovenox      Outpatient Follow-up (Specialty/Name of physician):  Alfredo Mota  NEUROLOGY  [address] [place] [address] [phone]  Phone: [phone]  Fax: [phone]  ---  IDT meeting 2/9:  SW: Pt lives in private home with daughter and spouse. 5 MARLENE and flight to bedroom. Independent prior to admission. Working.  OT: Setup for eating and grooming. CG for bathroom transfers. Impaired coordination, decreased balance, left inattention.  PT: Close supervision for transfers. Walks 150 ft with RW and close supervision. 12 steps with 1 HR and CG. Goals: mod-I for transfers and gait  SLP: mod I for communication. Decreased memory and recall  ELOD: 2/13. Dc with home care ( family preference)   Spoke with daughter on 2/11 regarding dc plans and prescriptions. Was not able to e-submit to preferred pharmacy. Prescriptions printed and in chart.  ---

## 2021-02-12 NOTE — PROGRESS NOTE ADULT - SUBJECTIVE AND OBJECTIVE BOX
Medicine Progress Note    Patient is a 60y old  Male who presents with a chief complaint of R MCA stroke  01.1 Left Body Involvement (Right Brain) (10 Feb 2021 11:58)      SUBJECTIVE / OVERNIGHT EVENTS:   seen and examined  Chart reviewed  No overnight events  Limb weakness improving with therapy  no BM for 4 days  No pain        ROS:  Denied fever/chills/CP/sob/palpitation/dizziness/ abd pain/nausea/vomiting/diarrhea/headaches. all other ROS neg    MEDICATIONS  (STANDING):  AQUAPHOR (petrolatum Ointment) 1 Application(s) Topical daily  aspirin enteric coated 81 milliGRAM(s) Oral daily  atorvastatin 40 milliGRAM(s) Oral at bedtime  bisacodyl 5 milliGRAM(s) Oral at bedtime  enoxaparin Injectable 40 milliGRAM(s) SubCutaneous daily  nicotine - 21 mG/24Hr(s) Patch 1 patch Transdermal daily  polyethylene glycol 3350 17 Gram(s) Oral two times a day  senna 2 Tablet(s) Oral at bedtime    MEDICATIONS  (PRN):  guaiFENesin   Syrup  (Sugar-Free) 200 milliGRAM(s) Oral every 6 hours PRN Cough      CAPILLARY BLOOD GLUCOSE    PHYSICAL EXAM:    Vital Signs Last 24 Hrs  T(C): 36.4 (12 Feb 2021 07:46), Max: 36.6 (11 Feb 2021 19:42)  T(F): 97.6 (12 Feb 2021 07:46), Max: 97.9 (11 Feb 2021 19:42)  HR: 80 (12 Feb 2021 07:46) (64 - 80)  BP: 121/86 (12 Feb 2021 07:46) (111/63 - 121/86)  BP(mean): --  RR: 16 (12 Feb 2021 07:46) (16 - 16)  SpO2: 98% (12 Feb 2021 07:46) (96% - 98%)      GENERAL: Not in distress. Alert    HEENT: MMM  NECK: Supple.  No JVD.    CARDIOVASCULAR: RRR S1, S2. No murmur/rubs/gallop  LUNGS: BLAE+, no rales, no wheezing, no rhonchi.    ABDOMEN: ND. Soft,  NT, no guarding / rebound / rigidity. BS normoactive. No CVA tenderness.    EXTREMITIES: no edema. no leg or calf TP  SKIN: warm and dry.  NEUROLOGIC: AAO*3. dysarthria. LUE weakness  PSYCHIATRIC: Calm.  No agitation.        LABS:                        15.8   9.14  )-----------( 227      ( 11 Feb 2021 06:33 )             47.3     02-11    142  |  105  |  16  ----------------------------<  82  3.7   |  26  |  0.91    Ca    8.9      11 Feb 2021 06:33    TPro  7.7  /  Alb  3.3  /  TBili  0.5  /  DBili  x   /  AST  22  /  ALT  32  /  AlkPhos  85  02-11              COVID-19 PCR: NotDetec (10 Feb 2021 17:30)  COVID-19 PCR: NotDetec (03 Feb 2021 16:45)      RADIOLOGY & ADDITIONAL TESTS:  Imaging from Last 24 Hours:    Electrocardiogram/QTc Interval:    COORDINATION OF CARE:  Care Discussed with Consultants/Other Providers:

## 2021-02-12 NOTE — PROGRESS NOTE ADULT - ATTENDING COMMENTS
Chart reviewed. Patient seen at bedside  Patient states that he feels well  Constipated with no BM since 2/8. Denies any GI issues  Lactulose today . Will add MOM prn     2. Has met rehab goals and scheduled for discharge home in am with home care  Meds reviewed and follow up discussed  Pharmacy - unable to eprescribe and scripts written

## 2021-02-12 NOTE — DISCHARGE NOTE NURSING/CASE MANAGEMENT/SOCIAL WORK - NSDCPEEMAIL_GEN_ALL_CORE
[facility] email [email or web address]

## 2021-02-13 VITALS
RESPIRATION RATE: 16 BRPM | TEMPERATURE: 98 F | DIASTOLIC BLOOD PRESSURE: 68 MMHG | HEART RATE: 66 BPM | SYSTOLIC BLOOD PRESSURE: 106 MMHG | OXYGEN SATURATION: 94 %

## 2021-02-13 PROCEDURE — 99232 SBSQ HOSP IP/OBS MODERATE 35: CPT

## 2021-02-13 PROCEDURE — 99238 HOSP IP/OBS DSCHRG MGMT 30/<: CPT

## 2021-02-13 RX ORDER — ASPIRIN/CALCIUM CARB/MAGNESIUM 324 MG
1 TABLET ORAL
Qty: 30 | Refills: 0
Start: 2021-02-13 | End: 2021-03-14

## 2021-02-13 RX ADMIN — Medication 1 PATCH: at 07:26

## 2021-02-13 RX ADMIN — Medication 1 PATCH: at 11:01

## 2021-02-13 NOTE — PROGRESS NOTE ADULT - ASSESSMENT
59 y/o M with PMHx chronic tobacco use presented to [facility] 1/29/21 for stroke symptoms that began the day before in [place]. He signed out of ED, flew to [place], and went to [facility]. MRI showed R MCA stroke. Patient admitted to [facility] for rehab.     #R MCA CVA with Left sided weakness  #Moderate left ICA stenosis  -Continue comprehensive rehab program  -Pain management, bowel regimen per rehab  -Continue ASA and Lipitor  -seen by vascular 2/9. no intervention needed. risk factor management. surveillance carotid doppler Q 6 months  -F/u vascular, outpatient  -F/u neuro, Dr. Mota, outpatient  -Aspiration, fall, safety, seizure precautions    #Intraparotid Nodules  -F/u with PCP outpatient    #Leukocytosis- resolved    #Tobacco use  -Nicotine patch  -counseled    #Hypoalbuminemia-resolved  #Obesity  - weight loss  - protien rich diet  - nutrition eval ongoing    #Constipation  - added dulcolax 5 mg at HS by primary team 2/11  - miralax  - Use suppository/Enema PRN  - c/w other meds    #DVT ppx - lovenox

## 2021-02-13 NOTE — PROGRESS NOTE ADULT - PROVIDER SPECIALTY LIST ADULT
Hospitalist
Rehab Medicine
Hospitalist
Hospitalist
Rehab Medicine
Hospitalist
Hospitalist
Rehab Medicine
Hospitalist
Hospitalist
Rehab Medicine
Rehab Medicine
Hospitalist

## 2021-02-13 NOTE — PROGRESS NOTE ADULT - ASSESSMENT
Assessment/Plan:  60y with no significant PMHx presented to [facility] 1/29/21 for stroke symptoms that began the day before in [place]. He signed out of ED, flew to [place], and went to [facility]. MRI showed R MCA stroke.       R MCA CVA with Left sided weakness  - Continue ASA and Lipitor  - F/u neuro, Dr. Mota, outpatient  - Discharge home today      Left Carotid Artery stenosis  - BL Carotid Doppler US: 50-69% L ICA stenosis  - vascular surgery consult 2/9 appreciated: pt to follow up as outpatient with surveillance carotid dopplers Q6 months to monitor progresion    Smoking:- continue nicotine patch and wean off as outpatient.      Intraparotid Nodules:- F/u with PCP outpatient    GI/Bowel:- senna qhs, miralax, PO dulcolax at qhs added 2/11 , lactulose x 1    /Bladder:  Continent-- voiding well    Diet/Dysphagia: regular with thin     DVT ppx: Lovenox      Outpatient Follow-up (Specialty/Name of physician):  Alfredo Mota  NEUROLOGY  [address] [place] [address] [phone]  Phone: [phone]  Fax: [phone]  ---  Discharge Home  ---

## 2021-02-13 NOTE — PROGRESS NOTE ADULT - SUBJECTIVE AND OBJECTIVE BOX
Patient is a 60y old  Male who presents with a chief complaint of R MCA stroke  01.1 Left Body Involvement (Right Brain) (13 Feb 2021 08:51)      Patient seen and examined at bedside.    ALLERGIES:  No Known Allergies    MEDICATIONS  (STANDING):  AQUAPHOR (petrolatum Ointment) 1 Application(s) Topical daily  aspirin enteric coated 81 milliGRAM(s) Oral daily  atorvastatin 40 milliGRAM(s) Oral at bedtime  bisacodyl 5 milliGRAM(s) Oral at bedtime  enoxaparin Injectable 40 milliGRAM(s) SubCutaneous daily  magnesium hydroxide Suspension 30 milliLiter(s) Oral once  nicotine - 21 mG/24Hr(s) Patch 1 patch Transdermal daily  polyethylene glycol 3350 17 Gram(s) Oral two times a day  senna 2 Tablet(s) Oral at bedtime    MEDICATIONS  (PRN):  bisacodyl Suppository 10 milliGRAM(s) Rectal daily PRN Constipation  guaiFENesin   Syrup  (Sugar-Free) 200 milliGRAM(s) Oral every 6 hours PRN Cough    Vital Signs Last 24 Hrs  T(F): 97.8 (13 Feb 2021 08:29), Max: 98.4 (12 Feb 2021 23:19)  HR: 66 (13 Feb 2021 08:29) (66 - 66)  BP: 106/68 (13 Feb 2021 08:29) (106/68 - 126/78)  RR: 16 (13 Feb 2021 08:29) (14 - 16)  SpO2: 94% (13 Feb 2021 08:29) (94% - 96%)  I&O's Summary    12 Feb 2021 07:01  -  13 Feb 2021 07:00  --------------------------------------------------------  IN: 0 mL / OUT: 600 mL / NET: -600 mL        PHYSICAL EXAM:  General: NAD, A/O x 3  ENT: MMM  Neck: Supple, No JVD  Lungs: Clear to auscultation bilaterally  Cardio: RRR, S1/S2, No murmurs  Abdomen: Soft, Nontender, Nondistended; Bowel sounds present  Extremities: No calf tenderness, No pitting edema    LABS:                        15.8   9.14  )-----------( 227      ( 11 Feb 2021 06:33 )             47.3       02-11    142  |  105  |  16  ----------------------------<  82  3.7   |  26  |  0.91    Ca    8.9      11 Feb 2021 06:33    TPro  7.7  /  Alb  3.3  /  TBili  0.5  /  DBili  x   /  AST  22  /  ALT  32  /  AlkPhos  85  02-11     eGFR if Non African American: 92 mL/min/1.73M2 (02-11-21 @ 06:33)  eGFR if : 106 mL/min/1.73M2 (02-11-21 @ 06:33)             01-30 Chol 128 mg/dL LDL -- HDL 21 mg/dL Trig 106 mg/dL                        RADIOLOGY & ADDITIONAL TESTS:    Care Discussed with Consultants/Other Providers:

## 2021-02-13 NOTE — PROGRESS NOTE ADULT - REASON FOR ADMISSION
R MCA stroke  01.1 Left Body Involvement (Right Brain)

## 2021-02-13 NOTE — PROGRESS NOTE ADULT - SUBJECTIVE AND OBJECTIVE BOX
Patient is a 60y old  Male who presents with a chief complaint of R MCA stroke  01.1 Left Body Involvement (Right Brain) (12 Feb 2021 10:55)      HPI:  59yo male with no pertinent PMH presented with stroke to [facility] 1/29/21. Per daughter, patient had symptoms starting 1/28/21 at 10am in the [facility]. He was seen in ER, had CT and covid test, then he was signed out and took a flight to [place]. Patient got off plane and presented to [facility] ED. Pt with left arm weakness and dysarthria, but denies headache, cp, sob, dizziness, NV. Patient was found to have MRI findings of acute and/or recent subacute R MCA stroke and a parotid lesion. Patient was started on ASA and statin.     Patient was medically stable for discharge to [place] for multidisciplinary acute rehab 2/3/21.   (03 Feb 2021 14:19)      SUBJECTIVE: Patient seen and examined in his room. Patient in NAD, no SOB, no n/v, no pain.  Denies abdominal pain, cramping, nausea, emesis.  Slept well.  No other complaints.    REVIEW OF SYSTEMS  Constitutional: No fever, No fatigue  HEENT: No eye pain  Pulm: No cough,  No shortness of breath  Cardio: No chest pain  GI:  No abdominal pain  Neuro: No headaches   MSK: No joint pain  Psych:  No depression, No anxiety    VITALS  Vital Signs Last 24 Hrs  T(C): 36.6 (13 Feb 2021 08:29), Max: 36.9 (12 Feb 2021 23:19)  T(F): 97.8 (13 Feb 2021 08:29), Max: 98.4 (12 Feb 2021 23:19)  HR: 66 (13 Feb 2021 08:29) (66 - 66)  BP: 106/68 (13 Feb 2021 08:29) (106/68 - 126/78)  BP(mean): --  RR: 16 (13 Feb 2021 08:29) (14 - 16)  SpO2: 94% (13 Feb 2021 08:29) (94% - 96%)       PHYSICAL EXAM  Gen - NAD, Comfortable  pulm - No respiratory distress, breathing comfortably on room air  Cardiovascular - pulse regular, warm and well perfused  Abdomen - Soft, NT/ND, +BS  Extremities - No C/C/E, No calf tenderness  Neuro-     Cognitive - AAOx4     Communication - Fluent, +dysarthria         Mild left facial droop     Motor -                    LEFT    UE - ShAB 3/5, EF 3/5, EE 3/5, WE 3/5,  3/5                    RIGHT UE - ShAB 5/5, EF 5/5, EE 5/5, WE 5/5,  5/5                    LEFT    LE - HF 4+/5, KE 5/5, DF 5/5, PF 5/5                    RIGHT LE - HF 5/5, KE 5/5, DF 5/5, PF 5/5           Coordination - L FTN dysmetria  Psychiatric - Mood stable, Affect WNL          RECENT LABS:                        15.8   9.14  )-----------( 227      ( 11 Feb 2021 06:33 )             47.3     02-11    142  |  105  |  16  ----------------------------<  82  3.7   |  26  |  0.91    Ca    8.9      11 Feb 2021 06:33    TPro  7.7  /  Alb  3.3  /  TBili  0.5  /  DBili  x   /  AST  22  /  ALT  32  /  AlkPhos  85  02-11    LIVER FUNCTIONS - ( 11 Feb 2021 06:33 )  Alb: 3.3 g/dL / Pro: 7.7 g/dL / ALK PHOS: 85 U/L / ALT: 32 U/L / AST: 22 U/L / GGT: x                   CAPILLARY BLOOD GLUCOSE      MEDICATIONS  (STANDING):  AQUAPHOR (petrolatum Ointment) 1 Application(s) Topical daily  aspirin enteric coated 81 milliGRAM(s) Oral daily  atorvastatin 40 milliGRAM(s) Oral at bedtime  bisacodyl 5 milliGRAM(s) Oral at bedtime  enoxaparin Injectable 40 milliGRAM(s) SubCutaneous daily  lactulose Syrup 20 Gram(s) Oral once  nicotine - 21 mG/24Hr(s) Patch 1 patch Transdermal daily  polyethylene glycol 3350 17 Gram(s) Oral two times a day  senna 2 Tablet(s) Oral at bedtime    MEDICATIONS  (PRN):  bisacodyl Suppository 10 milliGRAM(s) Rectal daily PRN Constipation  guaiFENesin   Syrup  (Sugar-Free) 200 milliGRAM(s) Oral every 6 hours PRN Cough

## 2021-02-14 ENCOUNTER — FORM ENCOUNTER (OUTPATIENT)
Age: 61
End: 2021-02-14

## 2021-03-04 ENCOUNTER — INPATIENT (INPATIENT)
Facility: HOSPITAL | Age: 61
LOS: 0 days | Discharge: HOME | DRG: 203 | End: 2021-03-05
Attending: INTERNAL MEDICINE | Admitting: INTERNAL MEDICINE
Payer: MEDICAID

## 2021-03-04 VITALS
OXYGEN SATURATION: 95 % | RESPIRATION RATE: 16 BRPM | TEMPERATURE: 99 F | SYSTOLIC BLOOD PRESSURE: 144 MMHG | HEART RATE: 79 BPM | DIASTOLIC BLOOD PRESSURE: 96 MMHG | HEIGHT: 67 IN | WEIGHT: 190.04 LBS

## 2021-03-04 DIAGNOSIS — Z29.9 ENCOUNTER FOR PROPHYLACTIC MEASURES, UNSPECIFIED: ICD-10-CM

## 2021-03-04 DIAGNOSIS — Z87.891 PERSONAL HISTORY OF NICOTINE DEPENDENCE: ICD-10-CM

## 2021-03-04 DIAGNOSIS — J20.9 ACUTE BRONCHITIS, UNSPECIFIED: ICD-10-CM

## 2021-03-04 DIAGNOSIS — I63.9 CEREBRAL INFARCTION, UNSPECIFIED: ICD-10-CM

## 2021-03-04 DIAGNOSIS — J44.0 CHRONIC OBSTRUCTIVE PULMONARY DISEASE WITH (ACUTE) LOWER RESPIRATORY INFECTION: ICD-10-CM

## 2021-03-04 DIAGNOSIS — E78.5 HYPERLIPIDEMIA, UNSPECIFIED: ICD-10-CM

## 2021-03-04 DIAGNOSIS — I69.334 MONOPLEGIA OF UPPER LIMB FOLLOWING CEREBRAL INFARCTION AFFECTING LEFT NON-DOMINANT SIDE: ICD-10-CM

## 2021-03-04 DIAGNOSIS — R06.00 DYSPNEA, UNSPECIFIED: ICD-10-CM

## 2021-03-04 LAB
ALBUMIN SERPL ELPH-MCNC: 3.1 G/DL — LOW (ref 3.5–5)
ALP SERPL-CCNC: 88 U/L — SIGNIFICANT CHANGE UP (ref 40–120)
ALT FLD-CCNC: 27 U/L DA — SIGNIFICANT CHANGE UP (ref 10–60)
ANION GAP SERPL CALC-SCNC: 2 MMOL/L — LOW (ref 5–17)
APTT BLD: 32.9 SEC — SIGNIFICANT CHANGE UP (ref 27.5–35.5)
AST SERPL-CCNC: 18 U/L — SIGNIFICANT CHANGE UP (ref 10–40)
BASOPHILS # BLD AUTO: 0.11 K/UL — SIGNIFICANT CHANGE UP (ref 0–0.2)
BASOPHILS NFR BLD AUTO: 1.3 % — SIGNIFICANT CHANGE UP (ref 0–2)
BILIRUB SERPL-MCNC: 0.4 MG/DL — SIGNIFICANT CHANGE UP (ref 0.2–1.2)
BUN SERPL-MCNC: 14 MG/DL — SIGNIFICANT CHANGE UP (ref 7–18)
CALCIUM SERPL-MCNC: 8.7 MG/DL — SIGNIFICANT CHANGE UP (ref 8.4–10.5)
CHLORIDE SERPL-SCNC: 110 MMOL/L — HIGH (ref 96–108)
CO2 SERPL-SCNC: 30 MMOL/L — SIGNIFICANT CHANGE UP (ref 22–31)
CREAT SERPL-MCNC: 0.87 MG/DL — SIGNIFICANT CHANGE UP (ref 0.5–1.3)
D DIMER BLD IA.RAPID-MCNC: 433 NG/ML DDU — HIGH
EOSINOPHIL # BLD AUTO: 0.49 K/UL — SIGNIFICANT CHANGE UP (ref 0–0.5)
EOSINOPHIL NFR BLD AUTO: 5.7 % — SIGNIFICANT CHANGE UP (ref 0–6)
GLUCOSE SERPL-MCNC: 98 MG/DL — SIGNIFICANT CHANGE UP (ref 70–99)
HCT VFR BLD CALC: 43.3 % — SIGNIFICANT CHANGE UP (ref 39–50)
HGB BLD-MCNC: 14.5 G/DL — SIGNIFICANT CHANGE UP (ref 13–17)
HIV 1 & 2 AB SERPL IA.RAPID: SIGNIFICANT CHANGE UP
IMM GRANULOCYTES NFR BLD AUTO: 0.4 % — SIGNIFICANT CHANGE UP (ref 0–1.5)
INR BLD: 1.06 RATIO — SIGNIFICANT CHANGE UP (ref 0.88–1.16)
LYMPHOCYTES # BLD AUTO: 1.6 K/UL — SIGNIFICANT CHANGE UP (ref 1–3.3)
LYMPHOCYTES # BLD AUTO: 18.7 % — SIGNIFICANT CHANGE UP (ref 13–44)
MCHC RBC-ENTMCNC: 31.3 PG — SIGNIFICANT CHANGE UP (ref 27–34)
MCHC RBC-ENTMCNC: 33.5 GM/DL — SIGNIFICANT CHANGE UP (ref 32–36)
MCV RBC AUTO: 93.3 FL — SIGNIFICANT CHANGE UP (ref 80–100)
MONOCYTES # BLD AUTO: 0.52 K/UL — SIGNIFICANT CHANGE UP (ref 0–0.9)
MONOCYTES NFR BLD AUTO: 6.1 % — SIGNIFICANT CHANGE UP (ref 2–14)
NEUTROPHILS # BLD AUTO: 5.81 K/UL — SIGNIFICANT CHANGE UP (ref 1.8–7.4)
NEUTROPHILS NFR BLD AUTO: 67.8 % — SIGNIFICANT CHANGE UP (ref 43–77)
NRBC # BLD: 0 /100 WBCS — SIGNIFICANT CHANGE UP (ref 0–0)
NT-PROBNP SERPL-SCNC: 31 PG/ML — SIGNIFICANT CHANGE UP (ref 0–125)
PLATELET # BLD AUTO: 177 K/UL — SIGNIFICANT CHANGE UP (ref 150–400)
POTASSIUM SERPL-MCNC: 4.6 MMOL/L — SIGNIFICANT CHANGE UP (ref 3.5–5.3)
POTASSIUM SERPL-SCNC: 4.6 MMOL/L — SIGNIFICANT CHANGE UP (ref 3.5–5.3)
PROT SERPL-MCNC: 7.4 G/DL — SIGNIFICANT CHANGE UP (ref 6–8.3)
PROTHROM AB SERPL-ACNC: 12.6 SEC — SIGNIFICANT CHANGE UP (ref 10.6–13.6)
RBC # BLD: 4.64 M/UL — SIGNIFICANT CHANGE UP (ref 4.2–5.8)
RBC # FLD: 13 % — SIGNIFICANT CHANGE UP (ref 10.3–14.5)
SARS-COV-2 RNA SPEC QL NAA+PROBE: SIGNIFICANT CHANGE UP
SODIUM SERPL-SCNC: 142 MMOL/L — SIGNIFICANT CHANGE UP (ref 135–145)
TROPONIN I SERPL-MCNC: <0.015 NG/ML — SIGNIFICANT CHANGE UP (ref 0–0.04)
WBC # BLD: 8.56 K/UL — SIGNIFICANT CHANGE UP (ref 3.8–10.5)
WBC # FLD AUTO: 8.56 K/UL — SIGNIFICANT CHANGE UP (ref 3.8–10.5)

## 2021-03-04 PROCEDURE — 71275 CT ANGIOGRAPHY CHEST: CPT | Mod: 26,MA

## 2021-03-04 PROCEDURE — 99222 1ST HOSP IP/OBS MODERATE 55: CPT

## 2021-03-04 PROCEDURE — 93010 ELECTROCARDIOGRAM REPORT: CPT

## 2021-03-04 PROCEDURE — 71045 X-RAY EXAM CHEST 1 VIEW: CPT | Mod: 26

## 2021-03-04 PROCEDURE — 99285 EMERGENCY DEPT VISIT HI MDM: CPT

## 2021-03-04 RX ORDER — ASPIRIN/CALCIUM CARB/MAGNESIUM 324 MG
81 TABLET ORAL DAILY
Refills: 0 | Status: DISCONTINUED | OUTPATIENT
Start: 2021-03-04 | End: 2021-03-05

## 2021-03-04 RX ORDER — ALBUTEROL 90 UG/1
1 AEROSOL, METERED ORAL EVERY 4 HOURS
Refills: 0 | Status: DISCONTINUED | OUTPATIENT
Start: 2021-03-04 | End: 2021-03-05

## 2021-03-04 RX ORDER — ATORVASTATIN CALCIUM 80 MG/1
40 TABLET, FILM COATED ORAL AT BEDTIME
Refills: 0 | Status: DISCONTINUED | OUTPATIENT
Start: 2021-03-04 | End: 2021-03-05

## 2021-03-04 RX ADMIN — ATORVASTATIN CALCIUM 40 MILLIGRAM(S): 80 TABLET, FILM COATED ORAL at 23:31

## 2021-03-04 RX ADMIN — Medication 81 MILLIGRAM(S): at 23:31

## 2021-03-04 NOTE — ED PROVIDER NOTE - PROGRESS NOTE DETAILS
CTA neg, labs otherwise wnl, except D-dimer. Spoke w Dr Be and MAR, will admit to Tele for further w/u. Covid test pending

## 2021-03-04 NOTE — H&P ADULT - HISTORY OF PRESENT ILLNESS
60M, from home, recently OK'ed from [facility],  with a PMhx of recently diagnosed HLD , R  MCA stroke with residual L sided weakness fEB 2021  reports to the ED c.o shortness of breath for x2 days. mainly at night time when he lays down and also with exertion. Patient denies any cough, fever, back pain, palpitations, dizziness, sick contact, recent travel, leg edema, leg pain , GI,  or neuro symptoms or any other acute complaints. NKDA     60M, from home, self ambulatory,  recently dc'ed from [facility],  with a PMhx of recently diagnosed HLD , R  MCA stroke with residual L arm weakness( feb 2021)  reports to the ED c.o shortness of breath for x2 days. Per Pt, he noticed SOB mostly in night when he uses stairs to get to his bedroom. No SOB on rest or flat position.  Patient denies any chest pain, PND, leg swelling, leg pain cough, fever, back pain, palpitations, dizziness, sick contact, recent travel. Per daughter his SPo2 at home were noted to be 89-91%   Pt mentions that he was an active smoker but had quit after his CVA in Feb 2021. Denies any alcohol or substance abuse.     In the E D  Pt appears comfortable, not in any distress  Vitals- 131/76, Hr 73, afebrile,SPO2- 95% on RA  CXR clear   covid- negative   D-DIMER 433  CTA chest- no evidence of any PE

## 2021-03-04 NOTE — H&P ADULT - PROBLEM SELECTOR PLAN 1
Pt admitted for episodes of SOB on exertion mostly occuring at night when he takes stairs to go to his room   Pt denies CP/PND/Orthopnea  Recent ECHO feb 2021- EF 60%  Vitals- 131/76, Hr 73, afebrile,SPO2- 95% on RA  CXR clear   covid- negative   D-DIMER 433  CTA chest- no evidence of any PE   Pt admitted for observation   Not  on any oxygen supplementation. Give O2 PRN

## 2021-03-04 NOTE — H&P ADULT - ATTENDING COMMENTS
Pt seen and examined.  Case discussed with MAR- Dr Cox.  60 year old with hx as above with subjective SOB X 2 days.  ROS not contributory.    Vital Signs Last 24 Hrs  T(C): 37 (04 Mar 2021 22:46), Max: 37.2 (04 Mar 2021 18:00)  T(F): 98.6 (04 Mar 2021 22:46), Max: 98.9 (04 Mar 2021 18:00)  HR: 73 (04 Mar 2021 22:46) (73 - 79)  BP: 131/76 (04 Mar 2021 22:46) (131/76 - 144/96)  RR: 16 (04 Mar 2021 22:46) (16 - 16)  SpO2: 98% (04 Mar 2021 22:46) (95% - 98%)    Exams     Labs                        14.5   8.56  )-----------( 177      ( 04 Mar 2021 19:49 )             43.3     03-04    142  |  110<H>  |  14  ----------------------------<  98  4.6   |  30  |  0.87    Ca    8.7      04 Mar 2021 19:49    TPro  7.4  /  Alb  3.1<L>  /  TBili  0.4  /  DBili  x   /  AST  18  /  ALT  27  /  AlkPhos  88  03-04    Trop - normal  COVID 19- -ve      CTA chest   No pulmonary thromboembolism.  Dependent atelectasis with minimal interstitial edema in the lung bases.    Old ECHO 01/2021  Summary:   1. Left ventricular ejection fraction, by visual estimation, is 60 to 65%.   2. Normal global left ventricular systolic function.   3. Normal left ventricular internal cavity size.   4. Spectral Doppler shows impaired relaxation pattern of left ventricular myocardial filling (Grade I diastolic dysfunction).   5. Normal right ventricular size and function.   6. Normal left atrial size.   7. Normal right atrial size.   8. There is no evidence of pericardial effusion.   9. Structurally normal mitral valve, with normal leaflet excursion.  10. Trace mitral valve regurgitation.  11. Normal trileaflet aortic valve with normal opening.  12. Possible Lambl's excrescense visualized on aortic valve leaflet tips representing a normal variant.    Impression  60 year old man with hx as above with 2 days of subjective SOB but with no objective findings on clinical history and exam and on further investigational studies. Family state he  was hypoxic at home which has not been noted in the ED since presentation.   Will admit for observation overnight.  Otherwise no further intervention at this time.    Plan  Admit for observation. Pt seen and examined.  Case discussed with MAR- Dr Cox.  60 year old with hx as above with subjective SOB X 2 days.  ROS not contributory.    Vital Signs Last 24 Hrs  T(C): 37 (04 Mar 2021 22:46), Max: 37.2 (04 Mar 2021 18:00)  T(F): 98.6 (04 Mar 2021 22:46), Max: 98.9 (04 Mar 2021 18:00)  HR: 73 (04 Mar 2021 22:46) (73 - 79)  BP: 131/76 (04 Mar 2021 22:46) (131/76 - 144/96)  RR: 16 (04 Mar 2021 22:46) (16 - 16)  SpO2: 98% (04 Mar 2021 22:46) (95% - 98%)    Exams   unremarkable findings    Labs                        14.5   8.56  )-----------( 177      ( 04 Mar 2021 19:49 )             43.3     03-04    142  |  110<H>  |  14  ----------------------------<  98  4.6   |  30  |  0.87    Ca    8.7      04 Mar 2021 19:49    TPro  7.4  /  Alb  3.1<L>  /  TBili  0.4  /  DBili  x   /  AST  18  /  ALT  27  /  AlkPhos  88  03-04    Trop - normal  COVID 19- -ve      CTA chest   No pulmonary thromboembolism.  Dependent atelectasis with minimal interstitial edema in the lung bases.    Old ECHO 01/2021  Summary:   1. Left ventricular ejection fraction, by visual estimation, is 60 to 65%.   2. Normal global left ventricular systolic function.   3. Normal left ventricular internal cavity size.   4. Spectral Doppler shows impaired relaxation pattern of left ventricular myocardial filling (Grade I diastolic dysfunction).   5. Normal right ventricular size and function.   6. Normal left atrial size.   7. Normal right atrial size.   8. There is no evidence of pericardial effusion.   9. Structurally normal mitral valve, with normal leaflet excursion.  10. Trace mitral valve regurgitation.  11. Normal trileaflet aortic valve with normal opening.  12. Possible Lambl's excrescense visualized on aortic valve leaflet tips representing a normal variant.    Impression  60 year old man with hx as above with 2 days of subjective SOB but with no objective findings on clinical history and exam and on further investigational studies. Family state he  was hypoxic at home which has not been noted in the ED since presentation.   Will admit for observation overnight.  Otherwise no further intervention at this time.    Plan  Admit for observation.

## 2021-03-04 NOTE — ED PROVIDER NOTE - OBJECTIVE STATEMENT
60 y.o male with a PMhx of recently diagnosed HLD after a MCA stroke with residual L sided weakness , [facility] reports to the ED c.o shortness of breath for x2 days, mainly at night time when he lays down and also with exertion. Patient denies any cough, fever, back pain, palpitations, dizziness, sick contact, recent travel, leg edema, leg pain , GI,  or neuro symptoms or any other acute complaints. NKDA 60 y.o male with a PMhx of recently diagnosed HLD after a MCA stroke with residual L sided weakness , [facility] reports to the ED c.o shortness of breath for x2 days, mainly at night time when he lays down and also with exertion. As per daughter, pulse ox at home at times 88-90%. Patient denies any cough, fever, back pain, palpitations, dizziness, sick contact, recent travel, leg edema, leg pain , GI,  or neuro symptoms or any other acute complaints. NKDA

## 2021-03-04 NOTE — H&P ADULT - NSHPLABSRESULTS_GEN_ALL_CORE
14.5   8.56  )-----------( 177      ( 04 Mar 2021 19:49 )             43.3       03-04    142  |  110<H>  |  14  ----------------------------<  98  4.6   |  30  |  0.87    Ca    8.7      04 Mar 2021 19:49    TPro  7.4  /  Alb  3.1<L>  /  TBili  0.4  /  DBili  x   /  AST  18  /  ALT  27  /  AlkPhos  88  03-04                  PT/INR - ( 04 Mar 2021 19:49 )   PT: 12.6 sec;   INR: 1.06 ratio         PTT - ( 04 Mar 2021 19:49 )  PTT:32.9 sec    Lactate Trend      CARDIAC MARKERS ( 04 Mar 2021 19:49 )  <0.015 ng/mL / x     / x     / x     / x            CAPILLARY BLOOD GLUCOSE    < from: CT Angio Chest w/ IV Cont (03.04.21 @ 20:58) >    IMPRESSION:  No pulmonary thromboembolism.    Dependent atelectasis with minimal interstitial edema in the lung

## 2021-03-04 NOTE — H&P ADULT - PROBLEM SELECTOR PLAN 2
Pt had a recent CVA R MCA with L sided weakness  Pt currently has mild motor weakness in LUE 4/5  Home meds- aspirin and statin   c/w home meds  f/u PT

## 2021-03-04 NOTE — H&P ADULT - PROBLEM SELECTOR PLAN 4
IMPROVE VTE Individual Risk Assessment    RISK                                                          Points  [] Previous VTE                                           3  [] Thrombophilia                                        2  [] Lower limb paralysis                              2   [] Current Cancer                                       2   [] Immobilization > 24 hrs                        1  [x] ICU/CCU stay > 24 hours                       1  [] Age > 60                                                   1    IMPROVE VTE Score: 1  lovenox

## 2021-03-04 NOTE — ED ADULT NURSE NOTE - OBJECTIVE STATEMENT
pt is here for shortness of breath.  pt stated that shortness of breath for 2 days, denied chest pain or fever, denied cough, hx stroke since 2 months ago, left side weakness, denied headache, no distress noted at this time.

## 2021-03-04 NOTE — H&P ADULT - NSHPSOCIALHISTORY_GEN_ALL_CORE
Pt was recently dc'ed from [facility]   Pt had quit smoking 1 month ago. denies any alc or substance abuse

## 2021-03-04 NOTE — ED PROVIDER NOTE - CLINICAL SUMMARY MEDICAL DECISION MAKING FREE TEXT BOX
possible new onset of CHF, vs pneumonia, vs Covid, will get cardiac enzymes, chest x ray , d dimer, possible admission for further workup

## 2021-03-04 NOTE — H&P ADULT - NSHPPHYSICALEXAM_GEN_ALL_CORE
Vital Signs (24 Hrs):  T(C): 37 (03-04-21 @ 22:46), Max: 37.2 (03-04-21 @ 18:00)  HR: 73 (03-04-21 @ 22:46) (73 - 79)  BP: 131/76 (03-04-21 @ 22:46) (131/76 - 144/96)  RR: 16 (03-04-21 @ 22:46) (16 - 16)  SpO2: 98% (03-04-21 @ 22:46) (95% - 98%)  Wt(kg): --  Daily Height in cm: 170.18 (04 Mar 2021 18:00)    Daily     I&O's Summary

## 2021-03-04 NOTE — ED ADULT NURSE NOTE - NSIMPLEMENTINTERV_GEN_ALL_ED
Implemented All Universal Safety Interventions:  [place] to call system. Call bell, personal items and telephone within reach. Instruct patient to call for assistance. Room bathroom lighting operational. Non-slip footwear when patient is off stretcher. Physically safe environment: no spills, clutter or unnecessary equipment. Stretcher in lowest position, wheels locked, appropriate side rails in place.

## 2021-03-05 ENCOUNTER — TRANSCRIPTION ENCOUNTER (OUTPATIENT)
Age: 61
End: 2021-03-05

## 2021-03-05 VITALS
HEART RATE: 52 BPM | DIASTOLIC BLOOD PRESSURE: 72 MMHG | RESPIRATION RATE: 16 BRPM | SYSTOLIC BLOOD PRESSURE: 120 MMHG | TEMPERATURE: 98 F | OXYGEN SATURATION: 97 %

## 2021-03-05 LAB
A1C WITH ESTIMATED AVERAGE GLUCOSE RESULT: 5.1 % — SIGNIFICANT CHANGE UP (ref 4–5.6)
ALBUMIN SERPL ELPH-MCNC: 2.9 G/DL — LOW (ref 3.5–5)
ALP SERPL-CCNC: 82 U/L — SIGNIFICANT CHANGE UP (ref 40–120)
ALT FLD-CCNC: 25 U/L DA — SIGNIFICANT CHANGE UP (ref 10–60)
ANION GAP SERPL CALC-SCNC: 6 MMOL/L — SIGNIFICANT CHANGE UP (ref 5–17)
APTT BLD: 30.2 SEC — SIGNIFICANT CHANGE UP (ref 27.5–35.5)
AST SERPL-CCNC: 13 U/L — SIGNIFICANT CHANGE UP (ref 10–40)
BILIRUB SERPL-MCNC: 0.6 MG/DL — SIGNIFICANT CHANGE UP (ref 0.2–1.2)
BUN SERPL-MCNC: 13 MG/DL — SIGNIFICANT CHANGE UP (ref 7–18)
CALCIUM SERPL-MCNC: 8.8 MG/DL — SIGNIFICANT CHANGE UP (ref 8.4–10.5)
CHLORIDE SERPL-SCNC: 109 MMOL/L — HIGH (ref 96–108)
CHOLEST SERPL-MCNC: 99 MG/DL — SIGNIFICANT CHANGE UP
CO2 SERPL-SCNC: 27 MMOL/L — SIGNIFICANT CHANGE UP (ref 22–31)
CREAT SERPL-MCNC: 0.89 MG/DL — SIGNIFICANT CHANGE UP (ref 0.5–1.3)
ESTIMATED AVERAGE GLUCOSE: 100 MG/DL — SIGNIFICANT CHANGE UP (ref 68–114)
FERRITIN SERPL-MCNC: 241 NG/ML — SIGNIFICANT CHANGE UP (ref 30–400)
FOLATE SERPL-MCNC: 4.2 NG/ML — LOW
GLUCOSE SERPL-MCNC: 86 MG/DL — SIGNIFICANT CHANGE UP (ref 70–99)
HCT VFR BLD CALC: 40.2 % — SIGNIFICANT CHANGE UP (ref 39–50)
HDLC SERPL-MCNC: 30 MG/DL — LOW
HGB BLD-MCNC: 13.9 G/DL — SIGNIFICANT CHANGE UP (ref 13–17)
INR BLD: 1.06 RATIO — SIGNIFICANT CHANGE UP (ref 0.88–1.16)
LIPID PNL WITH DIRECT LDL SERPL: 49 MG/DL — SIGNIFICANT CHANGE UP
MAGNESIUM SERPL-MCNC: 2.3 MG/DL — SIGNIFICANT CHANGE UP (ref 1.6–2.6)
MCHC RBC-ENTMCNC: 32.3 PG — SIGNIFICANT CHANGE UP (ref 27–34)
MCHC RBC-ENTMCNC: 34.6 GM/DL — SIGNIFICANT CHANGE UP (ref 32–36)
MCV RBC AUTO: 93.3 FL — SIGNIFICANT CHANGE UP (ref 80–100)
NON HDL CHOLESTEROL: 69 MG/DL — SIGNIFICANT CHANGE UP
NRBC # BLD: 0 /100 WBCS — SIGNIFICANT CHANGE UP (ref 0–0)
PHOSPHATE SERPL-MCNC: 4 MG/DL — SIGNIFICANT CHANGE UP (ref 2.5–4.5)
PLATELET # BLD AUTO: 166 K/UL — SIGNIFICANT CHANGE UP (ref 150–400)
POTASSIUM SERPL-MCNC: 3.4 MMOL/L — LOW (ref 3.5–5.3)
POTASSIUM SERPL-SCNC: 3.4 MMOL/L — LOW (ref 3.5–5.3)
PROT SERPL-MCNC: 6.9 G/DL — SIGNIFICANT CHANGE UP (ref 6–8.3)
PROTHROM AB SERPL-ACNC: 12.6 SEC — SIGNIFICANT CHANGE UP (ref 10.6–13.6)
RBC # BLD: 4.31 M/UL — SIGNIFICANT CHANGE UP (ref 4.2–5.8)
RBC # FLD: 13.2 % — SIGNIFICANT CHANGE UP (ref 10.3–14.5)
SODIUM SERPL-SCNC: 142 MMOL/L — SIGNIFICANT CHANGE UP (ref 135–145)
TRIGL SERPL-MCNC: 102 MG/DL — SIGNIFICANT CHANGE UP
TROPONIN I SERPL-MCNC: <0.015 NG/ML — SIGNIFICANT CHANGE UP (ref 0–0.04)
TSH SERPL-MCNC: 2.6 UU/ML — SIGNIFICANT CHANGE UP (ref 0.34–4.82)
VIT B12 SERPL-MCNC: 250 PG/ML — SIGNIFICANT CHANGE UP (ref 232–1245)
WBC # BLD: 9.08 K/UL — SIGNIFICANT CHANGE UP (ref 3.8–10.5)
WBC # FLD AUTO: 9.08 K/UL — SIGNIFICANT CHANGE UP (ref 3.8–10.5)

## 2021-03-05 PROCEDURE — 84484 ASSAY OF TROPONIN QUANT: CPT

## 2021-03-05 PROCEDURE — 99285 EMERGENCY DEPT VISIT HI MDM: CPT

## 2021-03-05 PROCEDURE — 85730 THROMBOPLASTIN TIME PARTIAL: CPT

## 2021-03-05 PROCEDURE — 80061 LIPID PANEL: CPT

## 2021-03-05 PROCEDURE — 71275 CT ANGIOGRAPHY CHEST: CPT

## 2021-03-05 PROCEDURE — 85025 COMPLETE CBC W/AUTO DIFF WBC: CPT

## 2021-03-05 PROCEDURE — 83036 HEMOGLOBIN GLYCOSYLATED A1C: CPT

## 2021-03-05 PROCEDURE — 82607 VITAMIN B-12: CPT

## 2021-03-05 PROCEDURE — 71045 X-RAY EXAM CHEST 1 VIEW: CPT

## 2021-03-05 PROCEDURE — 83880 ASSAY OF NATRIURETIC PEPTIDE: CPT

## 2021-03-05 PROCEDURE — 93005 ELECTROCARDIOGRAM TRACING: CPT

## 2021-03-05 PROCEDURE — 85379 FIBRIN DEGRADATION QUANT: CPT

## 2021-03-05 PROCEDURE — 86703 HIV-1/HIV-2 1 RESULT ANTBDY: CPT

## 2021-03-05 PROCEDURE — 82728 ASSAY OF FERRITIN: CPT

## 2021-03-05 PROCEDURE — 36415 COLL VENOUS BLD VENIPUNCTURE: CPT

## 2021-03-05 PROCEDURE — 85610 PROTHROMBIN TIME: CPT

## 2021-03-05 PROCEDURE — 85027 COMPLETE CBC AUTOMATED: CPT

## 2021-03-05 PROCEDURE — 83735 ASSAY OF MAGNESIUM: CPT

## 2021-03-05 PROCEDURE — 82746 ASSAY OF FOLIC ACID SERUM: CPT

## 2021-03-05 PROCEDURE — U0005: CPT

## 2021-03-05 PROCEDURE — 80053 COMPREHEN METABOLIC PANEL: CPT

## 2021-03-05 PROCEDURE — 99239 HOSP IP/OBS DSCHRG MGMT >30: CPT | Mod: GC

## 2021-03-05 PROCEDURE — 84100 ASSAY OF PHOSPHORUS: CPT

## 2021-03-05 PROCEDURE — 84443 ASSAY THYROID STIM HORMONE: CPT

## 2021-03-05 PROCEDURE — 87635 SARS-COV-2 COVID-19 AMP PRB: CPT

## 2021-03-05 RX ORDER — ATORVASTATIN CALCIUM 80 MG/1
1 TABLET, FILM COATED ORAL
Qty: 30 | Refills: 0
Start: 2021-03-05 | End: 2021-04-03

## 2021-03-05 RX ORDER — POTASSIUM CHLORIDE 20 MEQ
40 PACKET (EA) ORAL ONCE
Refills: 0 | Status: COMPLETED | OUTPATIENT
Start: 2021-03-05 | End: 2021-03-05

## 2021-03-05 RX ORDER — AZITHROMYCIN 500 MG/1
1 TABLET, FILM COATED ORAL
Qty: 5 | Refills: 0
Start: 2021-03-05 | End: 2021-03-09

## 2021-03-05 RX ORDER — AZITHROMYCIN 500 MG/1
500 TABLET, FILM COATED ORAL DAILY
Refills: 0 | Status: DISCONTINUED | OUTPATIENT
Start: 2021-03-05 | End: 2021-03-05

## 2021-03-05 RX ORDER — ALBUTEROL 90 UG/1
1 AEROSOL, METERED ORAL
Qty: 180 | Refills: 0
Start: 2021-03-05 | End: 2021-04-03

## 2021-03-05 RX ORDER — BUDESONIDE AND FORMOTEROL FUMARATE DIHYDRATE 160; 4.5 UG/1; UG/1
2 AEROSOL RESPIRATORY (INHALATION)
Refills: 0 | Status: DISCONTINUED | OUTPATIENT
Start: 2021-03-05 | End: 2021-03-05

## 2021-03-05 RX ORDER — ASPIRIN/CALCIUM CARB/MAGNESIUM 324 MG
1 TABLET ORAL
Qty: 30 | Refills: 0
Start: 2021-03-05 | End: 2021-04-03

## 2021-03-05 RX ADMIN — Medication 81 MILLIGRAM(S): at 11:21

## 2021-03-05 RX ADMIN — Medication 40 MILLIEQUIVALENT(S): at 11:21

## 2021-03-05 RX ADMIN — AZITHROMYCIN 500 MILLIGRAM(S): 500 TABLET, FILM COATED ORAL at 11:21

## 2021-03-05 NOTE — DISCHARGE NOTE PROVIDER - NSDCMRMEDTOKEN_GEN_ALL_CORE_FT
albuterol 90 mcg/inh inhalation aerosol: 1 puff(s) inhaled every 4 hours, As needed, Shortness of Breath and/or Wheezing  aspirin 81 mg oral delayed release tablet: 1 tab(s) orally once a day  atorvastatin 40 mg oral tablet: 1 tab(s) orally once a day (at bedtime)  azithromycin 500 mg oral tablet: 1 tab(s) orally once a day

## 2021-03-05 NOTE — DISCHARGE NOTE PROVIDER - NSFOLLOWUPCLINICS_GEN_ALL_ED_FT
[facility]  Pulmonary Medicine  [address] [place] [address]  Phone: [phone]  Fax: [phone]  Follow Up Time: 1 week

## 2021-03-05 NOTE — DISCHARGE NOTE PROVIDER - NSDCCPCAREPLAN_GEN_ALL_CORE_FT
PRINCIPAL DISCHARGE DIAGNOSIS  Diagnosis: Bronchitis  Assessment and Plan of Treatment: Your shortness of breath is likely from chronic bronchitis from your smoking. Take azithromycin antibioitic for 5 days. Take albuterol inhaler as needed. You will need to see Pulmonologist outpatient as you may need lung tests to be diagnosed with Chronic Obstructive Pulmonary Disease.      SECONDARY DISCHARGE DIAGNOSES  Diagnosis: CVA (cerebrovascular accident)  Assessment and Plan of Treatment: Continue aspirin and statin.     PRINCIPAL DISCHARGE DIAGNOSIS  Diagnosis: Bronchitis  Assessment and Plan of Treatment: Your shortness of breath is likely from chronic bronchitis from your smoking. Take azithromycin antibioitic for 5 days. Take albuterol inhaler as needed. Please do not smoke again. Your oxygen level was >95% on room air at rest and ambulation. You will need to see Pulmonologist outpatient as you may need lung tests to be diagnosed with Chronic Obstructive Pulmonary Disease.      SECONDARY DISCHARGE DIAGNOSES  Diagnosis: CVA (cerebrovascular accident)  Assessment and Plan of Treatment: Continue aspirin and statin.

## 2021-03-05 NOTE — DISCHARGE NOTE PROVIDER - CARE PROVIDER_API CALL
Christine Schmid)  Critical Care Medicine; Pulmonary [place] [address]  Phone: [phone]  Fax: [phone]  Follow Up Time: 1 week

## 2021-03-05 NOTE — DISCHARGE NOTE NURSING/CASE MANAGEMENT/SOCIAL WORK - PATIENT PORTAL LINK FT
You can access the FollowMyHealth Patient Portal offered by [facility] by registering at the following website: [email or web address]. By joining [employer]’s FollowMyHealth portal, you will also be able to view your health information using other applications (apps) compatible with our system.

## 2021-03-05 NOTE — DISCHARGE NOTE PROVIDER - HOSPITAL COURSE
60M, from home, self ambulatory,  recently dc'ed from [facility],  with a PMhx of recently diagnosed HLD , R  MCA stroke with residual L arm weakness( feb 2021)  reports to the ED c.o shortness of breath for x2 days. Per Pt, he noticed SOB mostly in night when he uses stairs to get to his bedroom. No SOB on rest or flat position.  Patient denies any chest pain, PND, leg swelling, leg pain cough, fever, back pain, palpitations, dizziness, sick contact, recent travel. Per daughter his SPo2 at home were noted to be 89-91%   Pt mentions that he was an active smoker for 20 years but had quit after his CVA in Feb 2021. Denies any alcohol or substance abuse.     In the E D  Pt appears comfortable, not in any distress  Vitals- 131/76, Hr 73, afebrile,SPO2- 95% on RA  CXR clear   covid- negative   D-DIMER 433  CTA chest- no evidence of any PE   Pt w/ clubbing on exam.     Pt likely with chronic bronchitis with component of COPD given smoking history. Will need Pulmonology followup for official diagnosis. Will send with azithromycin and albuterol.     Pt medically stable for discharge. Case discussed with attending. 60M, from home, self ambulatory,  recently dc'ed from [facility],  with a PMhx of recently diagnosed HLD , R  MCA stroke with residual L arm weakness( feb 2021)  reports to the ED c.o shortness of breath for x2 days. Per Pt, he noticed SOB mostly in night when he uses stairs to get to his bedroom. No SOB on rest or flat position.  Patient denies any chest pain, PND, leg swelling, leg pain cough, fever, back pain, palpitations, dizziness, sick contact, recent travel. Per daughter his SPo2 at home were noted to be 89-91%   Pt mentions that he was an active smoker for 20 years but had quit after his CVA in Feb 2021. Denies any alcohol or substance abuse.     In the E D  Pt appears comfortable, not in any distress  Vitals- 131/76, Hr 73, afebrile,SPO2- 95% on RA  CXR clear   covid- negative   D-DIMER 433  CTA chest- no evidence of any PE   Pt w/ clubbing on exam.     Pt likely with chronic bronchitis with component of COPD given smoking history. Will need Pulmonology followup for official diagnosis. Will send with azithromycin and albuterol.   Pt. saturating >95% on rest and on ambulation, and remained asymptomatic, explained to pt and daughter in detail about the likely underlying diagnosis of COPD, need of visiting pulmonologist and pulm. function test, use inhaler.   Pt medically stable for discharge. Case discussed with attending. 60M, from home, self ambulatory,  recently dc'ed from [facility],  with a PMhx of recently diagnosed HLD , R  MCA stroke with residual L arm weakness( feb 2021)  reports to the ED c.o shortness of breath for x2 days. Per Pt, he noticed SOB mostly in night when he uses stairs to get to his bedroom. No SOB on rest or flat position.  Patient denies any chest pain, PND, leg swelling, leg pain cough, fever, back pain, palpitations, dizziness, sick contact, recent travel. Per daughter his SPo2 at home were noted to be 89-91%   Pt mentions that he was an active smoker for 20 years but had quit after his CVA in Feb 2021. Denies any alcohol or substance abuse.     In the E D  Pt appears comfortable, not in any distress  Vitals- 131/76, Hr 73, afebrile,SPO2- 95% on RA  CXR clear   covid- negative   D-DIMER 433  CTA chest- no evidence of any PE   Pt w/ clubbing on exam.     Pt likely with chronic bronchitis  given smoking history. Will need Pulmonology followup for official diagnosis. Will send with azithromycin and albuterol.   Pt. saturating >95% on rest and on ambulation, and remained asymptomatic, explained to pt and daughter in detail  regarding need of visiting pulmonologist and pulm. function test, use inhaler.   Pt medically stable for discharge. Case discussed with attending.     Med Attending Addendum on 3/5/21  Patient seen and evaluated at bedside on the day of discharge with the resident team. [employer]  utilized. Patient endorsed resolution of sob, denied fever. Reports intermittent cough with scan sputum production.  Imaging w/out focal infiltrate. Exam reveals decreased breath sounds and mild digital clubbing. There was no pitting edema or clinical evidence of CHF. Recent TTE reviewed revealing EF of 60-65%.  Respiratory therapy consulted for inhaler use teaching. Patient discharged with outpt pulmonary follow up. resting and ambulatory room air saturations were appropriate.  35 min spent at patient bedside on physical exam and with coordination of care.   60M, from home, self ambulatory,  recently dc'ed from [facility],  with a PMhx of recently diagnosed HLD , R  MCA stroke with residual L arm weakness( feb 2021)  reports to the ED c.o shortness of breath for x2 days. Per Pt, he noticed SOB mostly in night when he uses stairs to get to his bedroom. No SOB on rest or flat position.  Patient denies any chest pain, PND, leg swelling, leg pain cough, fever, back pain, palpitations, dizziness, sick contact, recent travel. Per daughter his SPo2 at home were noted to be 89-91%   Pt mentions that he was an active smoker for 20 years but had quit after his CVA in Feb 2021. Denies any alcohol or substance abuse.     In the E D  Pt appears comfortable, not in any distress  Vitals- 131/76, Hr 73, afebrile,SPO2- 95% on RA  CXR clear   covid- negative   D-DIMER 433  CTA chest- no evidence of any PE   Pt w/ clubbing on exam.     Pt likely with chronic bronchitis  given smoking history. Will need Pulmonology followup for official diagnosis. Will send with azithromycin and albuterol.   Pt. saturating >95% on rest and on ambulation, and remained asymptomatic, explained to pt and daughter in detail  regarding need of visiting pulmonologist and pulm. function test, use inhaler.   Pt medically stable for discharge. Case discussed with attending.     Med Attending Addendum on 3/5/21  Patient seen and evaluated at bedside on the day of discharge with the resident team. [employer]  utilized. Patient endorsed resolution of sob, denied fever. Reports intermittent cough with scan sputum production.  Imaging w/out focal infiltrate. Exam reveals decreased breath sounds and mild digital clubbing. There was no pitting edema or clinical evidence of CHF. Recent TTE reviewed revealing EF of 60-65%.  Respiratory therapy consulted for inhaler use teaching. Patient discharged with outpt pulmonary follow up. resting and ambulatory room air saturations were appropriate. Patient quit smoking 2 months ago after he was diagnosed with acute cva. He will return home with home services on discharge  35 min spent at patient bedside on physical exam and with coordination of care.

## 2021-03-24 PROBLEM — Z00.00 ENCOUNTER FOR PREVENTIVE HEALTH EXAMINATION: Status: ACTIVE | Noted: 2021-03-24

## 2021-05-20 DIAGNOSIS — Z01.818 ENCOUNTER FOR OTHER PREPROCEDURAL EXAMINATION: ICD-10-CM

## 2021-05-21 ENCOUNTER — APPOINTMENT (OUTPATIENT)
Dept: DISASTER EMERGENCY | Facility: CLINIC | Age: 61
End: 2021-05-21

## 2021-05-22 LAB — SARS-COV-2 N GENE NPH QL NAA+PROBE: NOT DETECTED

## 2021-05-25 ENCOUNTER — APPOINTMENT (OUTPATIENT)
Dept: PULMONOLOGY | Facility: CLINIC | Age: 61
End: 2021-05-25
Payer: MEDICAID

## 2021-05-25 VITALS
DIASTOLIC BLOOD PRESSURE: 85 MMHG | HEIGHT: 71 IN | HEART RATE: 64 BPM | BODY MASS INDEX: 31.5 KG/M2 | SYSTOLIC BLOOD PRESSURE: 136 MMHG | WEIGHT: 225 LBS | OXYGEN SATURATION: 96 %

## 2021-05-25 VITALS — TEMPERATURE: 96.9 F

## 2021-05-25 DIAGNOSIS — Z86.73 PERSONAL HISTORY OF TRANSIENT ISCHEMIC ATTACK (TIA), AND CEREBRAL INFARCTION W/OUT RESIDUAL DEFICITS: ICD-10-CM

## 2021-05-25 DIAGNOSIS — Z87.891 PERSONAL HISTORY OF NICOTINE DEPENDENCE: ICD-10-CM

## 2021-05-25 PROCEDURE — 99072 ADDL SUPL MATRL&STAF TM PHE: CPT

## 2021-05-25 PROCEDURE — 99204 OFFICE O/P NEW MOD 45 MIN: CPT | Mod: 25

## 2021-05-25 PROCEDURE — ZZZZZ: CPT

## 2021-05-25 PROCEDURE — 94010 BREATHING CAPACITY TEST: CPT

## 2021-05-25 PROCEDURE — 94726 PLETHYSMOGRAPHY LUNG VOLUMES: CPT

## 2021-05-25 PROCEDURE — 94729 DIFFUSING CAPACITY: CPT

## 2021-06-03 ENCOUNTER — APPOINTMENT (OUTPATIENT)
Dept: PHYSICAL MEDICINE AND REHAB | Facility: CLINIC | Age: 61
End: 2021-06-03
Payer: MEDICAID

## 2021-06-03 VITALS
WEIGHT: 225 LBS | BODY MASS INDEX: 31.5 KG/M2 | SYSTOLIC BLOOD PRESSURE: 147 MMHG | HEIGHT: 71 IN | DIASTOLIC BLOOD PRESSURE: 88 MMHG | TEMPERATURE: 98 F | RESPIRATION RATE: 18 BRPM | HEART RATE: 77 BPM | OXYGEN SATURATION: 97 %

## 2021-06-03 DIAGNOSIS — Z86.39 PERSONAL HISTORY OF OTHER ENDOCRINE, NUTRITIONAL AND METABOLIC DISEASE: ICD-10-CM

## 2021-06-03 DIAGNOSIS — Z78.9 OTHER SPECIFIED HEALTH STATUS: ICD-10-CM

## 2021-06-03 PROCEDURE — 99214 OFFICE O/P EST MOD 30 MIN: CPT

## 2021-06-03 PROCEDURE — 99072 ADDL SUPL MATRL&STAF TM PHE: CPT

## 2021-06-03 NOTE — HISTORY OF PRESENT ILLNESS
[FreeTextEntry1] : BIU admission--   2/321 to 2/13/21\par \par 59yo male with no pertinent PMH presented with stroke to [facility] 1/29/21. Per daughter, patient had symptoms starting 1/28/21 at 10 am in the [place]. He was seen in ER, had CT and covid test, then he was signed out and took a flight to [place]. Patient got off plane and presented to [facility] ED. Pt with left arm weakness and dysarthria, but denies headache, cp, sob, dizziness, NV. Patient was found to have MRI findings of acute and/or recent subacute R MCA stroke and a parotid lesion. Patient was started on ASA and statin.\par \par Patient was medically stable for discharge to [facility] for multidisciplinary acute rehab 2/3/21.\par \par Pt was stable upon [facility]hab admission [facility]. Admitted with gait instability, ADL, and functional impairments. \par \par Rehab Course significant for vascular surgery consult for carotid US showing left ICA stenosis 50-69%. Patient recommended to follow up in 6 months with carotid doppler for monitoring.\par \par All other medical co-morbidities were stable. \par \par Pt did well during course of inpatient PT/OT/SLP rehab with significant functional improvements and met rehab goals prior to discharge.\par \par Pt was medically cleared on 2/13/21 for discharge to home with [facility] services.\par Discharge function--\par OT: Setup for eating and grooming. CG for bathroom transfers. Impaired coordination, decreased balance, left inattention.\par PT: Close supervision for transfers. Walks 150 ft with RW and close supervision. 12 steps with 1 HR and CG. Goals: mod-I for transfers and gait\par SLP: mod I for communication. Decreased memory and recall\par \par Completed home therapy.   Has been attending PT outpatient [facility] at [facility] in [place].  They are mainly working on his left shoulder pain.  \par He is walking well with no balance issues. No falls \par \par left shoulder pain started after discharge.  no acute inciting event.  worse when sleeping as sometimes turns on shoulder.  no other specific triggers.  PT has not helped much.\par Pain 7/10 on average-- can be variable.  Limited use of left UE due to pain. Has not seen orthopedics\par \par MRI 5/19/21-- Severe thinning of anterior half of supraspinatus tendon-- 90% near full thickness partial tear.  hook shaped acromion.  \par \par he is ambulating independently without Assistive device  inside and outside home. Negotiating stairs independently\par \par he requires 70% assistance with ADLs of   dressing, bathing & grooming--limited by pain in left shoulder\par \par Lives with wife    in house in [place] with  5  MARLENE and 19 steps inside\par \par Wife notes he has memory issues  \par \par Quit smoking since stroke\par

## 2021-06-03 NOTE — DATA REVIEWED
[MRI] : MRI [FreeTextEntry1] : MRI 5/19/21-- Severe thinning of anterior half of supraspinatus tendon-- 90% near full thickness partial tear.  hook shaped acromion.

## 2021-06-03 NOTE — REVIEW OF SYSTEMS
[Joint Pain] : joint pain [Muscle Pain] : muscle pain [Muscle Weakness] : muscle weakness [Negative] : Heme/Lymph [de-identified] : cognitive

## 2021-06-03 NOTE — ASSESSMENT
[FreeTextEntry1] : Left shoulder pain-- RTC tear-- Hold therapy.  REferred to Orthopedics to evaluate for surgical intervention and clearance to when can start OT.  \par \par Cognitive-- Referral to speech therapy\par \par Pt. does not need to continue PT.  \par \par All questions answered.\par

## 2021-06-03 NOTE — PHYSICAL EXAM
[FreeTextEntry1] : Constitutional: Alert, awake, no acute distress\par HEENT: EOMI, NC/AT, neck supple\par Cardiovascular: All extremities warm and well perfused\par Pulmonary: No respiratory distress, normal chest wall expansion\par Gastrointestinal: No abdominal distention\par \par Neuro:\par AAOx3, MOCA 23/30\par Recall 3/5 spontaneously. 5/5 with MC cues\par Attention--able to complete DOWB, difficulty with reverse digit span and serial 7's\par Executive function--unable to complete Trails B and clock drawing. \par \par CN: intact no nystagmus, visual fields intact, PERRLA, EOMI, no facial weakness or sensory deficit, shoulder kirstin intact, tongue midline\par Motor 5/5 bilat UE and LE,  left shoulder 4/5,--due to pain \par Sens intact bilat UE and LE\par Coordination intact--FNF and HTS intact\par Proprioception: intact\par \par gait--normal gait pattern\par Balance--able to perform tandem gait\par \par MSK:  left shoulder-- poor glenoscapular rotation.  AROM 100deg. abduction/flexion.  Limited IR-- 45 deg.  limited ER.  \par Neg. Neers and Cotto test,   + Empty can test \par IR and ER strength 5/5

## 2021-06-04 PROBLEM — Z87.891 FORMER SMOKER: Status: ACTIVE | Noted: 2021-06-04

## 2021-06-04 PROBLEM — Z86.73 HISTORY OF CEREBROVASCULAR ACCIDENT: Status: RESOLVED | Noted: 2021-06-04 | Resolved: 2021-06-04

## 2021-06-04 NOTE — ASSESSMENT
[FreeTextEntry1] : 60M with THOMAS, former smoker\par PFTs reveal normal spirometry, lung volumes and diffusion\par \par CT chest from 3/2021 reviewed:\par mosaic attenuation suggestive of small airway vs vascular disease\par bibasilar atelectasis \par no suspicious nodules\par \par Plan:\par yearly LDCT for LCS\par

## 2021-06-04 NOTE — HISTORY OF PRESENT ILLNESS
[TextBox_4] : 60M here for evaluation of possible COPD, referred by [facility]. He has a h/o recent R MCA stroke, completed rehab.\par He was seen in [facility] in March due to SOB and productive cough. Was treated with azithro at the time and overall has improved.\par He does report THOMAS, debbie with stairs. no wheezing. no longer smokes. No cough, no phlegm.

## 2021-06-11 ENCOUNTER — APPOINTMENT (OUTPATIENT)
Dept: ORTHOPEDIC SURGERY | Facility: CLINIC | Age: 61
End: 2021-06-11
Payer: MEDICAID

## 2021-06-11 VITALS
DIASTOLIC BLOOD PRESSURE: 87 MMHG | HEART RATE: 73 BPM | BODY MASS INDEX: 31.5 KG/M2 | WEIGHT: 225 LBS | HEIGHT: 71 IN | SYSTOLIC BLOOD PRESSURE: 149 MMHG

## 2021-06-11 DIAGNOSIS — M67.912 UNSPECIFIED DISORDER OF SYNOVIUM AND TENDON, LEFT SHOULDER: ICD-10-CM

## 2021-06-11 PROCEDURE — 99203 OFFICE O/P NEW LOW 30 MIN: CPT

## 2021-06-11 PROCEDURE — 99072 ADDL SUPL MATRL&STAF TM PHE: CPT

## 2021-06-30 ENCOUNTER — NON-APPOINTMENT (OUTPATIENT)
Age: 61
End: 2021-06-30

## 2021-07-08 ENCOUNTER — APPOINTMENT (OUTPATIENT)
Dept: ORTHOPEDIC SURGERY | Facility: CLINIC | Age: 61
End: 2021-07-08

## 2021-07-15 ENCOUNTER — APPOINTMENT (OUTPATIENT)
Dept: SPEECH THERAPY | Facility: CLINIC | Age: 61
End: 2021-07-15

## 2021-07-15 ENCOUNTER — NON-APPOINTMENT (OUTPATIENT)
Age: 61
End: 2021-07-15

## 2021-07-21 ENCOUNTER — APPOINTMENT (OUTPATIENT)
Dept: NEUROLOGY | Facility: CLINIC | Age: 61
End: 2021-07-21
Payer: MEDICAID

## 2021-07-21 VITALS
HEART RATE: 67 BPM | DIASTOLIC BLOOD PRESSURE: 94 MMHG | HEIGHT: 71 IN | WEIGHT: 225 LBS | SYSTOLIC BLOOD PRESSURE: 156 MMHG | BODY MASS INDEX: 31.5 KG/M2

## 2021-07-21 PROCEDURE — 99072 ADDL SUPL MATRL&STAF TM PHE: CPT

## 2021-07-21 PROCEDURE — 99205 OFFICE O/P NEW HI 60 MIN: CPT

## 2021-07-30 ENCOUNTER — APPOINTMENT (OUTPATIENT)
Dept: DERMATOLOGY | Facility: CLINIC | Age: 61
End: 2021-07-30
Payer: MEDICAID

## 2021-07-30 ENCOUNTER — NON-APPOINTMENT (OUTPATIENT)
Age: 61
End: 2021-07-30

## 2021-07-30 ENCOUNTER — LABORATORY RESULT (OUTPATIENT)
Age: 61
End: 2021-07-30

## 2021-07-30 DIAGNOSIS — Z82.3 FAMILY HISTORY OF STROKE: ICD-10-CM

## 2021-07-30 DIAGNOSIS — D48.5 NEOPLASM OF UNCERTAIN BEHAVIOR OF SKIN: ICD-10-CM

## 2021-07-30 DIAGNOSIS — R07.9 CHEST PAIN, UNSPECIFIED: ICD-10-CM

## 2021-07-30 DIAGNOSIS — R23.8 OTHER SKIN CHANGES: ICD-10-CM

## 2021-07-30 DIAGNOSIS — Z84.89 FAMILY HISTORY OF OTHER SPECIFIED CONDITIONS: ICD-10-CM

## 2021-07-30 DIAGNOSIS — I10 ESSENTIAL (PRIMARY) HYPERTENSION: ICD-10-CM

## 2021-07-30 DIAGNOSIS — B35.6 TINEA CRURIS: ICD-10-CM

## 2021-07-30 PROCEDURE — 11301 SHAVE SKIN LESION 0.6-1.0 CM: CPT

## 2021-07-30 PROCEDURE — 99203 OFFICE O/P NEW LOW 30 MIN: CPT | Mod: 25

## 2021-07-30 RX ORDER — CLOPIDOGREL BISULFATE 75 MG/1
75 TABLET, FILM COATED ORAL DAILY
Qty: 90 | Refills: 3 | Status: ACTIVE | COMMUNITY

## 2021-07-30 RX ORDER — KETOCONAZOLE 20 MG/G
2 CREAM TOPICAL
Qty: 1 | Refills: 0 | Status: ACTIVE | COMMUNITY
Start: 2021-07-30 | End: 1900-01-01

## 2021-07-30 RX ORDER — LOSARTAN POTASSIUM 25 MG/1
25 TABLET, FILM COATED ORAL DAILY
Qty: 90 | Refills: 3 | Status: ACTIVE | COMMUNITY

## 2021-07-30 RX ORDER — ASPIRIN 81 MG/1
81 TABLET, CHEWABLE ORAL
Qty: 90 | Refills: 3 | Status: ACTIVE | COMMUNITY

## 2021-07-30 RX ORDER — ATORVASTATIN CALCIUM 40 MG/1
40 TABLET, FILM COATED ORAL DAILY
Qty: 90 | Refills: 3 | Status: ACTIVE | COMMUNITY

## 2021-07-30 RX ORDER — CYCLOBENZAPRINE HYDROCHLORIDE 5 MG/1
5 TABLET, FILM COATED ORAL DAILY
Refills: 0 | Status: ACTIVE | COMMUNITY

## 2021-08-05 ENCOUNTER — TRANSCRIPTION ENCOUNTER (OUTPATIENT)
Age: 61
End: 2021-08-05

## 2021-08-05 ENCOUNTER — APPOINTMENT (OUTPATIENT)
Dept: PHYSICAL MEDICINE AND REHAB | Facility: CLINIC | Age: 61
End: 2021-08-05
Payer: MEDICAID

## 2021-08-05 VITALS
SYSTOLIC BLOOD PRESSURE: 140 MMHG | BODY MASS INDEX: 32.62 KG/M2 | DIASTOLIC BLOOD PRESSURE: 90 MMHG | HEART RATE: 96 BPM | TEMPERATURE: 98 F | OXYGEN SATURATION: 96 % | HEIGHT: 71 IN | WEIGHT: 233 LBS

## 2021-08-05 PROCEDURE — 99213 OFFICE O/P EST LOW 20 MIN: CPT

## 2021-08-05 NOTE — ASSESSMENT
[FreeTextEntry1] : Rotator cuff pain significantly improved with PT-- cont. PT course\par \par Balance-- PT ordered-- New Rx to address balance and cont. left shoulder RTC exercises.  Pt. educated on importance of HEP \par \par f/u with speech therapy-- will reach out to [facility] to see if pt's appt. can be moved earlier.  \par \par Pt. asking regarding return to driving-- offerred referral for behind the wheel assessment.  Pt. and wife decline due to cost.  Will reevaluate for driving after next visit-- after Cognitive assessment.  \par \par All questions answered.\par

## 2021-08-05 NOTE — HISTORY OF PRESENT ILLNESS
[FreeTextEntry1] : 61 yo Male presents for f/u of right MCA infarct.    BIU admission-- 2/321 to 2/13/21\par \par Last seen 6/3/21. \par Attending PT BIW--PT notes revewed\par  Working left shoulder ROM and therapy to address RTC tear. Started in June.  pain is improved. Able to reach above head without pain.  ROM improved but not 100%.  Function improved.    \par \par Wife notes concerns with balance-- pt. states tends to bump left side.  no falls.  Feels weak going up stairs. No issues going down stairs. \par \par Did not start Speech therapy as yet -- wife states she was given an appt. with [facility] in Late August.  \par \par he is ambulating independently without Assistive device inside and outside home. Negotiating stairs independently\par \par function improved -- dressing, bathing & grooming himself. \par \par Lives with wife in house in [place] with 5 MARLENE and 19 steps inside\par \par \par f/u with Neurology 7/21--note reviewed

## 2021-08-05 NOTE — PHYSICAL EXAM
[FreeTextEntry1] : \par Constitutional: Alert, awake, no acute distress\par HEENT: EOMI, NC/AT, neck supple\par Cardiovascular: All extremities warm and well perfused\par Pulmonary: No respiratory distress, normal chest wall expansion\par Gastrointestinal: No abdominal distention\par \par Neuro:\par AAOx3, MOCA 24.5/30\par Recall 3/5 spontaneously. 5/5 with MC cues\par Attention--able to complete DOWB and serial 7's.  difficulty with reverse digit span \par Executive function--Some difficulty with Trails B--but able to complete with cueing and difficulty with proper number and hand placement on clock drawing. \par \par CN: intact no nystagmus, visual fields intact, PERRLA, EOMI, no facial weakness or sensory deficit, shoulder kirstin intact, tongue midline\par Motor 5/5 bilat UE and LE,  \par Sens intact bilat UE and LE\par Coordination intact--FNF and HTS intact\par Proprioception: intact\par \par gait--normal gait pattern\par Balance--able to perform tandem gait\par \par MSK: left shoulder--improved glenoscapular rotation and ROM. AROM 160deg. abduction/flexion. IR/ER improved.  ER WFL.  IR impaired-- 50deg. \par Neg. Neers and Cotto test, + Empty can test \par IR and ER strength 5/5. \par

## 2021-08-10 ENCOUNTER — APPOINTMENT (OUTPATIENT)
Dept: CARDIOLOGY | Facility: CLINIC | Age: 61
End: 2021-08-10
Payer: MEDICAID

## 2021-08-10 ENCOUNTER — NON-APPOINTMENT (OUTPATIENT)
Age: 61
End: 2021-08-10

## 2021-08-10 VITALS
BODY MASS INDEX: 32.34 KG/M2 | DIASTOLIC BLOOD PRESSURE: 86 MMHG | WEIGHT: 231 LBS | HEART RATE: 67 BPM | SYSTOLIC BLOOD PRESSURE: 152 MMHG | OXYGEN SATURATION: 98 % | HEIGHT: 71 IN

## 2021-08-10 DIAGNOSIS — Z86.79 PERSONAL HISTORY OF OTHER DISEASES OF THE CIRCULATORY SYSTEM: ICD-10-CM

## 2021-08-10 PROCEDURE — 99204 OFFICE O/P NEW MOD 45 MIN: CPT

## 2021-08-10 PROCEDURE — 93000 ELECTROCARDIOGRAM COMPLETE: CPT

## 2021-08-10 RX ORDER — AMLODIPINE BESYLATE 10 MG/1
10 TABLET ORAL DAILY
Qty: 90 | Refills: 1 | Status: ACTIVE | COMMUNITY
Start: 2021-08-10 | End: 1900-01-01

## 2021-08-13 ENCOUNTER — NON-APPOINTMENT (OUTPATIENT)
Age: 61
End: 2021-08-13

## 2021-08-16 ENCOUNTER — RX RENEWAL (OUTPATIENT)
Age: 61
End: 2021-08-16

## 2021-08-16 RX ORDER — TRIAMCINOLONE ACETONIDE 1 MG/G
0.1 CREAM TOPICAL
Qty: 454 | Refills: 0 | Status: ACTIVE | COMMUNITY
Start: 2021-07-30 | End: 1900-01-01

## 2021-08-28 PROBLEM — Z86.79 HISTORY OF PERIPHERAL VASCULAR DISEASE: Status: RESOLVED | Noted: 2021-08-28 | Resolved: 2021-08-28

## 2021-08-28 NOTE — REVIEW OF SYSTEMS
[Leg Claudication] : intermittent leg claudication [Joint Pain] : joint pain [Memory Lapses Or Loss] : memory lapses or loss [Weakness] : weakness [Negative] : Heme/Lymph

## 2021-08-28 NOTE — REASON FOR VISIT
[FreeTextEntry1] : Mr. Norman is a 60 yo M with recent surgery for PAD, CVA in January of this year, former 1.5 PPD smoker, presenting with elevated BPs. He recalls having an echo prior to his surgery but does not recall results nor having a stress test. Prior to January, he had not been taking any medications. His speech and memory as well as left sided weakness have significantly improved, in his opinion. He still has posterior right leg pain after walking around the block for about twenty minutes, however he states that his symptoms have significantly improved since the surgery. He denies chest pain or shortness of breath. \par asa 81 amlodipine 5 atorvastain 40 \par \par \par

## 2021-08-28 NOTE — PHYSICAL EXAM
[Diminished Pedal Pulses ___] : diminished pedal pulses [unfilled] [Normal] : alert and oriented, normal memory

## 2021-08-28 NOTE — DISCUSSION/SUMMARY
[FreeTextEntry1] : Mr. Norman is a 62 yo M with recent surgery for PAD, CVA in January of this year, former 1.5 PPD smoker, presenting with elevated BPs\par \par Will increase Amlodipine to 10 mg daily, unclear if patient is taking Losartan. BPs are significantly elevated. Will obtain all recent testing from PCP to determine need for further workup. Continue DAPT, will need to clarify PAD as well as related procedure details

## 2021-09-17 ENCOUNTER — APPOINTMENT (OUTPATIENT)
Dept: DERMATOLOGY | Facility: CLINIC | Age: 61
End: 2021-09-17

## 2021-09-21 ENCOUNTER — APPOINTMENT (OUTPATIENT)
Dept: CARDIOLOGY | Facility: CLINIC | Age: 61
End: 2021-09-21

## 2021-09-30 ENCOUNTER — APPOINTMENT (OUTPATIENT)
Dept: PHYSICAL MEDICINE AND REHAB | Facility: CLINIC | Age: 61
End: 2021-09-30

## 2021-10-07 ENCOUNTER — APPOINTMENT (OUTPATIENT)
Dept: PHYSICAL MEDICINE AND REHAB | Facility: CLINIC | Age: 61
End: 2021-10-07
Payer: MEDICAID

## 2021-10-07 VITALS
SYSTOLIC BLOOD PRESSURE: 129 MMHG | RESPIRATION RATE: 18 BRPM | WEIGHT: 231 LBS | HEART RATE: 77 BPM | TEMPERATURE: 98.3 F | BODY MASS INDEX: 32.34 KG/M2 | HEIGHT: 71 IN | DIASTOLIC BLOOD PRESSURE: 84 MMHG | OXYGEN SATURATION: 97 %

## 2021-10-07 DIAGNOSIS — R26.9 UNSPECIFIED ABNORMALITIES OF GAIT AND MOBILITY: ICD-10-CM

## 2021-10-07 DIAGNOSIS — M75.102 UNSPECIFIED ROTATOR CUFF TEAR OR RUPTURE OF LEFT SHOULDER, NOT SPECIFIED AS TRAUMATIC: ICD-10-CM

## 2021-10-07 DIAGNOSIS — R41.3 OTHER AMNESIA: ICD-10-CM

## 2021-10-07 PROCEDURE — 99213 OFFICE O/P EST LOW 20 MIN: CPT

## 2021-10-07 RX ORDER — NORTRIPTYLINE HYDROCHLORIDE 10 MG/1
10 CAPSULE ORAL
Qty: 30 | Refills: 1 | Status: ACTIVE | COMMUNITY
Start: 2021-10-07 | End: 1900-01-01

## 2021-10-10 PROBLEM — R26.9 ABNORMAL GAIT: Status: ACTIVE | Noted: 2021-10-10

## 2021-10-10 NOTE — HISTORY OF PRESENT ILLNESS
[FreeTextEntry1] : 59 yo Male presents with his daughter for f/u of right MCA infarct. BIU admission-- 2/321 to 2/13/21\par \par Last seen 8/5/21. \par \par Last PT visit 8/13/21-- working on left shoulder ROM--  Notes reviewed. \par Stopped therapy-- was making pain worse\par \par Left shoulder pain is worse than last visit, 6-7/10,  occurs with any movement.  \par Taking Tylenol and Motrin alternating 2-3 times daily-- States helps pain.  Pt. and his daughter Note that he has more limitation in his ADLs-- unable to dress himself or bath independently due to the shoulder pain and family is helping him more.  \par \par Pt. has a 90%  tear of supraspinatus on MRI in May-- saw Orthopedics-- rec. conservative management and pt. initially improved with therapy but now doing worse.  \par \par Daughter also notes concerns regarding pt's memory after the stroke.  He was referred to [facility] for outpatient speech and had an appointment in late August but had to cancel as multiple members of his family were affected by COVID.  \par Pt. and his family are grieving over the recent loss of his son a couple weeks ago. \par Pts daughter notes that pt. c/o fatigue during the day and poor sleep at night (even from before their family tragedy).  States they tried melatonin 10mg at night w/o improvement.  \par \par \par \par \par

## 2021-10-10 NOTE — ASSESSMENT
[FreeTextEntry1] : Left shoulder RTC tear-- offered pt. Corticosteroid injection today but he declines.  Pt. and daughter state they do not want him to go for surgical repair.  I discussed conservative measures for pain control-- medications, therapy--which is has failed, and injections.  \par --Rec. Referral to Dr. Ortiz or other [facility] physiatrist for further evaluation of left shoulder RTC tear and management options.  Pt. and daughter interested in referral\par --Rx provided for Nortriptyline to help control shoulder pain as well as help improve sleep.  Daughter instructed to notify me if not improved. \par \par --Cognition-- will benefit from Speech therapy--rx provided\par \par --New Rx provided for PT.  \par \par --Referred to [facility] for therapy.  \par \par All questions answered.\par

## 2021-10-10 NOTE — REASON FOR VISIT
[Follow-Up] : a follow-up visit [Family Member] : family member [FreeTextEntry1] : right MCA infarct

## 2021-10-10 NOTE — REVIEW OF SYSTEMS
[Fatigue] : fatigue [Muscle Pain] : muscle pain [Muscle Weakness] : muscle weakness [Difficulty Walking] : difficulty walking [Insomnia] : insomnia [Negative] : Heme/Lymph [de-identified] : cognitive deficits

## 2021-10-10 NOTE — PHYSICAL EXAM
[FreeTextEntry1] : Constitutional: Alert, awake, no acute distress\par HEENT: EOMI, NC/AT, neck supple\par Cardiovascular: All extremities warm and well perfused\par Pulmonary: No respiratory distress, normal chest wall expansion\par Gastrointestinal: No abdominal distention\par \par Neuro:\par AAOx3, MOCA 24.5/30\par Recall 4/5 spontaneously. 5/5 with MC cues\par Attention--able to complete DOWB and reverse digit span. difficulty with serial 7's.\par Executive function-- difficulty with Trails B-- and difficulty with proper number and hand placement on clock drawing. \par \par CN: intact no nystagmus, visual fields intact, PERRLA, EOMI, no facial weakness or sensory deficit, shoulder ikrstin intact, tongue midline\par Motor 5/5 bilat UE and LE, except left shoulder 4/5 limited by pain\par Sens intact bilat UE and LE\par Coordination intact--FNF and HTS intact-- some limitation in left UE due to pain\par \par \par gait--reciprocal gait pattern\par Balance--impaired tandem gait\par \par MSK: left shoulder-ROM wors. AROM 160deg. abduction/flexion. IR/ER improved. ER WFL. IR impaired-- 50deg. \par Neg. Neers and Cotto test, + Empty can test \par IR and ER strength 5/5. \par  \par

## 2021-10-25 ENCOUNTER — APPOINTMENT (OUTPATIENT)
Dept: NEUROLOGY | Facility: CLINIC | Age: 61
End: 2021-10-25
Payer: MEDICAID

## 2021-10-25 VITALS
SYSTOLIC BLOOD PRESSURE: 137 MMHG | WEIGHT: 226 LBS | DIASTOLIC BLOOD PRESSURE: 95 MMHG | HEIGHT: 71 IN | HEART RATE: 86 BPM | BODY MASS INDEX: 31.64 KG/M2

## 2021-10-25 DIAGNOSIS — I63.511 CEREBRAL INFARCTION DUE TO UNSPECIFIED OCCLUSION OR STENOSIS OF RIGHT MIDDLE CEREBRAL ARTERY: ICD-10-CM

## 2021-10-25 DIAGNOSIS — G81.94 HEMIPLEGIA, UNSPECIFIED AFFECTING LEFT NONDOMINANT SIDE: ICD-10-CM

## 2021-10-25 PROCEDURE — 99214 OFFICE O/P EST MOD 30 MIN: CPT

## 2022-03-01 ENCOUNTER — APPOINTMENT (OUTPATIENT)
Dept: NEUROLOGY | Facility: CLINIC | Age: 62
End: 2022-03-01

## 2022-04-25 ENCOUNTER — APPOINTMENT (OUTPATIENT)
Dept: NEUROLOGY | Facility: CLINIC | Age: 62
End: 2022-04-25

## 2024-11-25 NOTE — PATIENT PROFILE ADULT - FUNCTIONAL SCREEN CURRENT LEVEL: SWALLOWING (IF SCORE 2 OR MORE FOR ANY ITEM, CONSULT REHAB SERVICES), MLM)
Call placed to patient, no answer. Left message for patient to call back to schedule the VCE (pill cam)   0 = swallows foods/liquids without difficulty
